# Patient Record
Sex: FEMALE | Race: WHITE | Employment: FULL TIME | URBAN - METROPOLITAN AREA
[De-identification: names, ages, dates, MRNs, and addresses within clinical notes are randomized per-mention and may not be internally consistent; named-entity substitution may affect disease eponyms.]

---

## 2017-03-27 ENCOUNTER — MYC MEDICAL ADVICE (OUTPATIENT)
Dept: FAMILY MEDICINE | Facility: CLINIC | Age: 47
End: 2017-03-27

## 2017-03-28 DIAGNOSIS — F41.8 SITUATIONAL ANXIETY: Primary | ICD-10-CM

## 2017-03-28 RX ORDER — LORAZEPAM 0.5 MG/1
TABLET ORAL
Qty: 20 TABLET | Refills: 0 | Status: SHIPPED | OUTPATIENT
Start: 2017-03-28 | End: 2018-03-06

## 2017-04-26 NOTE — PROGRESS NOTES
Shanna is a 47 year old female  that presents today for annual exam:   HPI:  Concern:  - gaining weight;  less exercise - work is stressful.   1) Hypothyroidism: On levothyroxine 100 mcg   2) Hyperlipidemia: not on anything at this time    Knows she will need a statin    Will have labs today  Wt Readings from Last 5 Encounters:   17 80.3 kg (177 lb)   16 79.1 kg (174 lb 4.8 oz)   16 76.2 kg (168 lb)   16 77.2 kg (170 lb 1.6 oz)   07/22/15 77.1 kg (170 lb)       HCM: mammogram 2016;  Colonoscopy ;  ROS:  General: generally feeling well   Head/Eyes: none  Ears/Nose/Throat: none  Cardiovascular: none  Respiratory: none  Gastrointestinal: none  Breast: none  Genitourinary: none  Sexual Function: none  Musculoskeletal: some back pain   Skin: none  Neurological: none  Mental Health: none  Endocrine: none  OB/GYN HISTORY:  1)  1 para 1002 with a twins, a boy and girl via . Her  has had a vasectomy. Her periods are heavier and more irregular. Every 1-2 months.   Past Medical History:   Diagnosis Date     Constipation      Depressive disorder, not elsewhere classified     was on prozac in past, now on Wellbutrin     Hypothyroid      IBS (irritable bowel syndrome)     Better off gluten and dairy     Infectious mononucleosis      Other kyphoscoliosis and scoliosis     see surgical history - multiple surgeries     Pure hypercholesterolemia    HCM: Mammogram 2015;  PAST SURGICAL HISTORY:   Jorge placement in her teens for scoliosis  FAMILY HISTORY:   Mother with Hyperlipdemia  Life Style Modifiers:   Tobacco:  reports that she quit smoking about 17 years ago. She has a 4.00 pack-year smoking history. She has never used smokeless tobacco.   Alcohol:  reports that she drinks alcohol.   Drug use:  reports that she does not use illicit drugs.  PAST SURGICAL HISTORY:  Past Surgical History:   Procedure Laterality Date     BIOPSY OF SKIN LESION       C   "DELIVERY ONLY      , Low Cervical     C SPINE FUSION,POSTER,13+ SGMTS      fusions x2 and removal of rods x 2 and one for infection     HC OPEN TREATMENT CLAVICULAR FRACTURE INTERNAL FX      after non-union- left   FAMILY HISTORY:  Family History   Problem Relation Age of Onset     Lipids Mother      Lipids Maternal Grandmother      Lipids Maternal Grandfather      Lipids Maternal Aunt      Lipids Maternal Uncle      Asthma No family hx of      C.A.D. 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   MEDICATIONS:  Current Outpatient Prescriptions   Medication Sig Dispense Refill     LORazepam (ATIVAN) 0.5 MG tablet Take 30 minutes prior to departure.  Do not operate a vehicle after taking this medication 20 tablet 0     buPROPion (WELLBUTRIN XL) 300 MG 24 hr tablet Take 1 tablet (300 mg) by mouth daily 90 tablet 3     valACYclovir (VALTREX) 1000 mg tablet Take 1 tablet (1,000 mg) by mouth 2 times daily Take 1 tablet (1,000 mg) by mouth 2 times daily x 3-5 days during outbreak 30 tablet 3     levothyroxine (SYNTHROID,LEVOTHROID) 100 MCG tablet Take 1 tablet (100 mcg) by mouth daily 90 tablet 4     B Complex Vitamins (B COMPLEX PO)        VALACYCLOVIR HCL PO Take 1,000 mg by mouth daily as needed       tretinoin (RETIN-A) 0.025 % cream Use every night 50 g 12     Omega-3 Fatty Acids (FISH OIL PO)        ibuprofen (ADVIL,MOTRIN) 600 MG tablet Take 1 tablet by mouth 3 times daily as needed.     ALLERGIES:  Nkda [no known drug allergies]  VITALS:  /70  Pulse 64  Ht 1.727 m (5' 8\")  Wt 80.3 kg (177 lb)  LMP 2017  BMI 26.91 kg/m2.   PHYSICAL EXAM:  Constitutional: Well appearing woman in no acute distress.   Psychological: appropriate mood.  Eyes: anicteric, normal extra-ocular movements,  pupils are equal and reactive to light.   Ears, Nose and " Throat: tympanic membranes clear, nose clear and free of lesions, throat clear, moist mucous membrames, neck supple with full range of motion.    Neck: No thyroidmegaly.   Cardiovascular: regular rate and rhythm, normal S1 and S2, no murmurs, rubs or gallops, peripheral pulses full and symmetric   Respiratory: clear to auscultation, no wheezes or crackles, normal breath sounds.  Breast: Symmetrical without visible distortion or swelling. No masses noted. No nipple inversion, no breast dimpling or puckering. Axillary area without masses or lympadenapathy.   Gastrointestinal: positive bowel sounds, nontender, no hepatosplenomegaly, no masses. No guarding or rebound.  Genitourinary: Pelvic exam: normal vagina and vulva, normal cervix without lesions or tenderness, uterus normal size anteverted, adenxa normal in size without tenderness.  Musculoskeletal: full range of motion    Skin: no concerning lesions, no jaundice.  Neurological: normal gait, no tremor.   Diagnoses and associated orders for this visit:  Annual physical exam  -  Annual well exam including breast exam and pap smear/HPV   -  Mammogram at the breast center 11/2017  -  Goal weight 165  Hypothyroidism  - Levothyroxine (SYNTHROID,LEVOTHROID)100 mg  - T3 Free; Future  - T4 free; Future  - TSH; Future  Hyperlipidemia LDL goal <130  -     Has changed diet to gluten and dairy free  -     Will recheck lipid next week when fasting  -     Agrees to start simvastatin 10 mg  -     Basic Metabolic Panel; Future

## 2017-04-27 ENCOUNTER — OFFICE VISIT (OUTPATIENT)
Dept: FAMILY MEDICINE | Facility: CLINIC | Age: 47
End: 2017-04-27
Attending: FAMILY MEDICINE
Payer: COMMERCIAL

## 2017-04-27 VITALS
HEIGHT: 68 IN | WEIGHT: 177 LBS | SYSTOLIC BLOOD PRESSURE: 117 MMHG | DIASTOLIC BLOOD PRESSURE: 70 MMHG | HEART RATE: 64 BPM | BODY MASS INDEX: 26.83 KG/M2

## 2017-04-27 DIAGNOSIS — E03.9 ACQUIRED HYPOTHYROIDISM: ICD-10-CM

## 2017-04-27 DIAGNOSIS — E78.5 HYPERLIPIDEMIA LDL GOAL <130: ICD-10-CM

## 2017-04-27 DIAGNOSIS — Z12.4 CERVICAL CANCER SCREENING: ICD-10-CM

## 2017-04-27 DIAGNOSIS — Z00.00 ANNUAL PHYSICAL EXAM: Primary | ICD-10-CM

## 2017-04-27 DIAGNOSIS — E03.8 OTHER SPECIFIED HYPOTHYROIDISM: ICD-10-CM

## 2017-04-27 PROCEDURE — 99213 OFFICE O/P EST LOW 20 MIN: CPT | Mod: ZF

## 2017-04-27 PROCEDURE — G0145 SCR C/V CYTO,THINLAYER,RESCR: HCPCS | Performed by: FAMILY MEDICINE

## 2017-04-27 RX ORDER — LEVOTHYROXINE SODIUM 100 UG/1
100 TABLET ORAL DAILY
Qty: 90 TABLET | Refills: 4 | Status: SHIPPED | OUTPATIENT
Start: 2017-04-27 | End: 2018-05-22

## 2017-04-27 RX ORDER — ALBUTEROL SULFATE 90 UG/1
2 AEROSOL, METERED RESPIRATORY (INHALATION)
COMMUNITY
Start: 2017-04-04 | End: 2017-04-27

## 2017-04-27 RX ORDER — SIMVASTATIN 10 MG
10 TABLET ORAL AT BEDTIME
Qty: 90 TABLET | Refills: 1 | Status: SHIPPED | OUTPATIENT
Start: 2017-04-27 | End: 2017-10-18

## 2017-04-27 RX ORDER — CODEINE PHOSPHATE/GUAIFENESIN 10-100MG/5
5 LIQUID (ML) ORAL
COMMUNITY
Start: 2017-04-04 | End: 2017-04-27

## 2017-04-27 ASSESSMENT — PAIN SCALES - GENERAL: PAINLEVEL: NO PAIN (0)

## 2017-04-27 ASSESSMENT — ANXIETY QUESTIONNAIRES
5. BEING SO RESTLESS THAT IT IS HARD TO SIT STILL: SEVERAL DAYS
GAD7 TOTAL SCORE: 7
3. WORRYING TOO MUCH ABOUT DIFFERENT THINGS: SEVERAL DAYS
1. FEELING NERVOUS, ANXIOUS, OR ON EDGE: SEVERAL DAYS
7. FEELING AFRAID AS IF SOMETHING AWFUL MIGHT HAPPEN: SEVERAL DAYS
2. NOT BEING ABLE TO STOP OR CONTROL WORRYING: SEVERAL DAYS
6. BECOMING EASILY ANNOYED OR IRRITABLE: SEVERAL DAYS

## 2017-04-27 ASSESSMENT — PATIENT HEALTH QUESTIONNAIRE - PHQ9: 5. POOR APPETITE OR OVEREATING: SEVERAL DAYS

## 2017-04-27 NOTE — LETTER
2017       RE: Shanna Kang  [address] [facility] [address]     Dear Colleague,    Thank you for referring your patient, Shanna Kang, to the [facility] at [facility]. Please see a copy of my visit note below.    Shanna is a 47 year old female  that presents today for annual exam:   HPI:  Concern:  - gaining weight;  less exercise - work is stressful.   1) Hypothyroidism: On levothyroxine 100 mcg   2) Hyperlipidemia: not on anything at this time    Knows she will need a statin    Will have labs today  Wt Readings from Last 5 Encounters:   17 80.3 kg (177 lb)   16 79.1 kg (174 lb 4.8 oz)   16 76.2 kg (168 lb)   16 77.2 kg (170 lb 1.6 oz)   07/22/15 77.1 kg (170 lb)       HCM: mammogram 2016;  Colonoscopy ;  ROS:  General: generally feeling well   Head/Eyes: none  Ears/Nose/Throat: none  Cardiovascular: none  Respiratory: none  Gastrointestinal: none  Breast: none  Genitourinary: none  Sexual Function: none  Musculoskeletal: some back pain   Skin: none  Neurological: none  Mental Health: none  Endocrine: none  OB/GYN HISTORY:  1)  1 para 1002 with a twins, a boy and girl via . Her  has had a vasectomy. Her periods are heavier and more irregular. Every 1-2 months.   Past Medical History:   Diagnosis Date     Constipation      Depressive disorder, not elsewhere classified     was on prozac in past, now on Wellbutrin     Hypothyroid      IBS (irritable bowel syndrome)     Better off gluten and dairy     Infectious mononucleosis      Other kyphoscoliosis and scoliosis     see surgical history - multiple surgeries     Pure hypercholesterolemia    HCM: Mammogram 2015;  PAST SURGICAL HISTORY:   Jorge placement in her teens for scoliosis  FAMILY HISTORY:   Mother with Hyperlipdemia  Life Style Modifiers:   Tobacco:  reports that she quit smoking about 17 years ago. She has a  4.00 pack-year smoking history. She has never used smokeless tobacco.   Alcohol:  reports that she drinks alcohol.   Drug use:  reports that she does not use illicit drugs.  PAST SURGICAL HISTORY:  Past Surgical History:   Procedure Laterality Date     BIOPSY OF SKIN LESION       C  DELIVERY ONLY      , Low Cervical     C SPINE FUSION,POSTER,13+ SGMTS      fusions x2 and removal of rods x 2 and one for infection     HC OPEN TREATMENT CLAVICULAR FRACTURE INTERNAL FX      after non-union- left   FAMILY HISTORY:  Family History   Problem Relation Age of Onset     Lipids Mother      Lipids Maternal Grandmother      Lipids Maternal Grandfather      Lipids Maternal Aunt      Lipids Maternal Uncle      Asthma No family hx of      C.A.D. 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   MEDICATIONS:  Current Outpatient Prescriptions   Medication Sig Dispense Refill     LORazepam (ATIVAN) 0.5 MG tablet Take 30 minutes prior to departure.  Do not operate a vehicle after taking this medication 20 tablet 0     buPROPion (WELLBUTRIN XL) 300 MG 24 hr tablet Take 1 tablet (300 mg) by mouth daily 90 tablet 3     valACYclovir (VALTREX) 1000 mg tablet Take 1 tablet (1,000 mg) by mouth 2 times daily Take 1 tablet (1,000 mg) by mouth 2 times daily x 3-5 days during outbreak 30 tablet 3     levothyroxine (SYNTHROID,LEVOTHROID) 100 MCG tablet Take 1 tablet (100 mcg) by mouth daily 90 tablet 4     B Complex Vitamins (B COMPLEX PO)        VALACYCLOVIR HCL PO Take 1,000 mg by mouth daily as needed       tretinoin (RETIN-A) 0.025 % cream Use every night 50 g 12     Omega-3 Fatty Acids (FISH OIL PO)        ibuprofen (ADVIL,MOTRIN) 600 MG tablet Take 1 tablet by mouth 3 times daily as needed.     ALLERGIES:  Nkda [no known drug allergies]  VITALS:  /70  Pulse 64  Ht  "1.727 m (5' 8\")  Wt 80.3 kg (177 lb)  LMP 02/14/2017  BMI 26.91 kg/m2.   PHYSICAL EXAM:  Constitutional: Well appearing woman in no acute distress.   Psychological: appropriate mood.  Eyes: anicteric, normal extra-ocular movements,  pupils are equal and reactive to light.   Ears, Nose and Throat: tympanic membranes clear, nose clear and free of lesions, throat clear, moist mucous membrames, neck supple with full range of motion.    Neck: No thyroidmegaly.   Cardiovascular: regular rate and rhythm, normal S1 and S2, no murmurs, rubs or gallops, peripheral pulses full and symmetric   Respiratory: clear to auscultation, no wheezes or crackles, normal breath sounds.  Breast: Symmetrical without visible distortion or swelling. No masses noted. No nipple inversion, no breast dimpling or puckering. Axillary area without masses or lympadenapathy.   Gastrointestinal: positive bowel sounds, nontender, no hepatosplenomegaly, no masses. No guarding or rebound.  Genitourinary: Pelvic exam: normal vagina and vulva, normal cervix without lesions or tenderness, uterus normal size anteverted, adenxa normal in size without tenderness.  Musculoskeletal: full range of motion    Skin: no concerning lesions, no jaundice.  Neurological: normal gait, no tremor.   Diagnoses and associated orders for this visit:  Annual physical exam  -  Annual well exam including breast exam and pap smear/HPV   -  Mammogram at the breast center 11/2017  -  Goal weight 165  Hypothyroidism  - Levothyroxine (SYNTHROID,LEVOTHROID)100 mg  - T3 Free; Future  - T4 free; Future  - TSH; Future  Hyperlipidemia LDL goal <130  -     Has changed diet to gluten and dairy free  -     Will recheck lipid next week when fasting  -     Agrees to start simvastatin 10 mg  -     Basic Metabolic Panel; Future    Again, thank you for allowing me to participate in the care of your patient.      Sincerely,    Betty Duncan MD      "

## 2017-04-27 NOTE — MR AVS SNAPSHOT
After Visit Summary   4/27/2017    Shanna Kang    MRN: [id number]           Patient Information     Date Of Birth          1970        Visit Information        Provider Department      4/27/2017 9:00 AM Betty Duncan MD [facility]        Today's Diagnoses     Annual physical exam    -  1    Cervical cancer screening        Hyperlipidemia LDL goal <130        Acquired hypothyroidism        Other specified hypothyroidism           Follow-ups after your visit        Future tests that were ordered for you today     Open Future Orders        Priority Expected Expires Ordered    TSH Routine 4/28/2017 4/27/2018 4/27/2017    Lipid Panel Routine 4/28/2017 4/27/2018 4/27/2017    Basic Metabolic Panel Routine 4/28/2017 4/27/2018 4/27/2017            Who to contact     Please call your clinic at [phone] to:    Ask questions about your health    Make or cancel appointments    Discuss your medicines    Learn about your test results    Speak to your doctor   If you have compliments or concerns about an experience at your clinic, or if you wish to file a complaint, please contact [facility] Patient Relations at [phone] or email us at [email or web address]         Additional Information About Your Visit        MyChart Information     [employer] gives you secure access to your electronic health record. If you see a primary care provider, you can also send messages to your care team and make appointments. If you have questions, please call your primary care clinic.  If you do not have a primary care provider, please call [phone] and they will assist you.      [email or web address] is an electronic gateway that provides easy, online access to your medical records. With [email or web address], you can request a clinic appointment, read your test results, renew a prescription or communicate with your care team.     To access your existing account, please contact your  "[facility] or call [phone] for assistance.        Care EveryWhere ID     This is your Care EveryWhere ID. This could be used by other organizations to access your [place] medical records  [id number]        Your Vitals Were     Pulse Height Last Period BMI (Body Mass Index)          64 1.727 m (5' 8\") 02/14/2017 26.91 kg/m2         Blood Pressure from Last 3 Encounters:   04/27/17 117/70   08/29/16 110/56   04/26/16 115/64    Weight from Last 3 Encounters:   04/27/17 80.3 kg (177 lb)   11/08/16 79.1 kg (174 lb 4.8 oz)   08/29/16 76.2 kg (168 lb)              We Performed the Following     Obtaining, preparing and conveyance of cervical or vaginal smear to laboratory.     Pap imaged thin layer screen reflex to HPV if ASCUS - recommended age 25 - 29 years          Today's Medication Changes          These changes are accurate as of: 4/27/17 10:31 AM.  If you have any questions, ask your nurse or doctor.               Start taking these medicines.        Dose/Directions    simvastatin 10 MG tablet   Commonly known as:  ZOCOR   Used for:  Hyperlipidemia LDL goal <130   Started by:  Betty Duncan MD        Dose:  10 mg   Take 1 tablet (10 mg) by mouth At Bedtime   Quantity:  90 tablet   Refills:  1            Where to get your medicines      These medications were sent to [place] [address] [place] [facility] [address] [facility]  [address] [facility] [address]     Phone:  [phone]     levothyroxine 100 MCG tablet    simvastatin 10 MG tablet                Primary Care Provider Office Phone # Fax #    Betty Duncan [phone]       [facility] [address] [facility] [address]  [facility] [address]        Thank you!     Thank you for choosing [facility]  for your care. Our goal is always to provide you with excellent care. Hearing back from our patients is one way we can continue to improve our services. Please " take a few minutes to complete the written survey that you may receive in the mail after your visit with us. Thank you!             Your Updated Medication List - Protect others around you: Learn how to safely use, store and throw away your medicines at www.disposemymeds.org.          This list is accurate as of: 4/27/17 10:31 AM.  Always use your most recent med list.                   Brand Name Dispense Instructions for use    B COMPLEX PO          buPROPion 300 MG 24 hr tablet    WELLBUTRIN XL    90 tablet    Take 1 tablet (300 mg) by mouth daily       FISH OIL PO          ibuprofen 600 MG tablet    ADVIL/MOTRIN     Take 1 tablet by mouth 3 times daily as needed.       levothyroxine 100 MCG tablet    SYNTHROID/LEVOTHROID    90 tablet    Take 1 tablet (100 mcg) by mouth daily       LORazepam 0.5 MG tablet    ATIVAN    20 tablet    Take 30 minutes prior to departure.  Do not operate a vehicle after taking this medication       simvastatin 10 MG tablet    ZOCOR    90 tablet    Take 1 tablet (10 mg) by mouth At Bedtime       tretinoin 0.025 % cream    RETIN-A    50 g    Use every night       valACYclovir 1000 mg tablet    VALTREX    30 tablet    Take 1 tablet (1,000 mg) by mouth 2 times daily Take 1 tablet (1,000 mg) by mouth 2 times daily x 3-5 days during outbreak

## 2017-04-28 DIAGNOSIS — E03.9 ACQUIRED HYPOTHYROIDISM: ICD-10-CM

## 2017-04-28 DIAGNOSIS — Z00.00 ANNUAL PHYSICAL EXAM: ICD-10-CM

## 2017-04-28 DIAGNOSIS — E78.5 HYPERLIPIDEMIA LDL GOAL <130: ICD-10-CM

## 2017-04-28 LAB
ANION GAP SERPL CALCULATED.3IONS-SCNC: 5 MMOL/L (ref 3–14)
BUN SERPL-MCNC: 11 MG/DL (ref 7–30)
CALCIUM SERPL-MCNC: 8.7 MG/DL (ref 8.5–10.1)
CHLORIDE SERPL-SCNC: 104 MMOL/L (ref 94–109)
CHOLEST SERPL-MCNC: 284 MG/DL
CO2 SERPL-SCNC: 30 MMOL/L (ref 20–32)
CREAT SERPL-MCNC: 0.77 MG/DL (ref 0.52–1.04)
GFR SERPL CREATININE-BSD FRML MDRD: 81 ML/MIN/1.7M2
GLUCOSE SERPL-MCNC: 82 MG/DL (ref 70–99)
HDLC SERPL-MCNC: 100 MG/DL
LDLC SERPL CALC-MCNC: 174 MG/DL
NONHDLC SERPL-MCNC: 184 MG/DL
POTASSIUM SERPL-SCNC: 3.6 MMOL/L (ref 3.4–5.3)
SODIUM SERPL-SCNC: 139 MMOL/L (ref 133–144)
TRIGL SERPL-MCNC: 53 MG/DL
TSH SERPL DL<=0.05 MIU/L-ACNC: 3.16 MU/L (ref 0.4–4)

## 2017-04-28 ASSESSMENT — PATIENT HEALTH QUESTIONNAIRE - PHQ9: SUM OF ALL RESPONSES TO PHQ QUESTIONS 1-9: 4

## 2017-04-28 ASSESSMENT — ANXIETY QUESTIONNAIRES: GAD7 TOTAL SCORE: 7

## 2017-05-01 LAB
COPATH REPORT: NORMAL
PAP: NORMAL

## 2017-07-01 DIAGNOSIS — E03.8 OTHER SPECIFIED HYPOTHYROIDISM: ICD-10-CM

## 2017-07-03 RX ORDER — LEVOTHYROXINE SODIUM 100 UG/1
TABLET ORAL
Qty: 90 TABLET | Refills: 3 | OUTPATIENT
Start: 2017-07-03

## 2017-08-16 DIAGNOSIS — F32.89 MINOR DEPRESSIVE DISORDER: ICD-10-CM

## 2017-08-17 RX ORDER — BUPROPION HYDROCHLORIDE 300 MG/1
TABLET ORAL
Qty: 90 TABLET | Refills: 3 | Status: SHIPPED | OUTPATIENT
Start: 2017-08-17 | End: 2018-05-22

## 2017-08-17 NOTE — TELEPHONE ENCOUNTER
Received refill request for wellbutrin  Last in clinic 4/2017 for annual and PHQ score 4. Medication not discussed specifically so will forward to Dr Duncan for approval

## 2017-10-18 DIAGNOSIS — E78.5 HYPERLIPIDEMIA LDL GOAL <130: ICD-10-CM

## 2017-10-19 RX ORDER — SIMVASTATIN 10 MG
TABLET ORAL
Qty: 90 TABLET | Refills: 1 | Status: SHIPPED | OUTPATIENT
Start: 2017-10-19 | End: 2018-05-10

## 2017-10-19 NOTE — TELEPHONE ENCOUNTER
Received refill request for simvastatin. Spoke with Dr. Duncan and she agrees to refill. Would like patient to have labs repeated when she is able to get them done. Nurse input order and refilled Rx.

## 2017-12-27 ENCOUNTER — RADIANT APPOINTMENT (OUTPATIENT)
Dept: MAMMOGRAPHY | Facility: CLINIC | Age: 47
End: 2017-12-27
Payer: COMMERCIAL

## 2017-12-27 DIAGNOSIS — Z12.31 VISIT FOR SCREENING MAMMOGRAM: ICD-10-CM

## 2018-03-05 ENCOUNTER — MYC MEDICAL ADVICE (OUTPATIENT)
Dept: FAMILY MEDICINE | Facility: CLINIC | Age: 48
End: 2018-03-05

## 2018-03-06 DIAGNOSIS — F41.8 SITUATIONAL ANXIETY: ICD-10-CM

## 2018-03-06 RX ORDER — LORAZEPAM 0.5 MG/1
TABLET ORAL
Qty: 20 TABLET | Refills: 0 | Status: SHIPPED | OUTPATIENT
Start: 2018-03-06 | End: 2019-02-07

## 2018-05-10 DIAGNOSIS — E78.5 HYPERLIPIDEMIA LDL GOAL <130: ICD-10-CM

## 2018-05-15 RX ORDER — SIMVASTATIN 10 MG
TABLET ORAL
Qty: 90 TABLET | Refills: 0 | Status: SHIPPED | OUTPATIENT
Start: 2018-05-15 | End: 2018-05-22

## 2018-05-15 NOTE — TELEPHONE ENCOUNTER
Spoke to Shanna about zocor refill. Last annual  4/2017 so will schedule today. Transferred to ,90 day supply refill sent to cover until seen.Pt indicated understanding and agreed with plan.

## 2018-05-21 NOTE — PROGRESS NOTES
Shanna is a 48 year old female  that presents today for annual exam:   HPI:  Concern:  1) Hypothyroidism: On levothyroxine 100 mcg   2) Hyperlipidemia:  with . Simvastatin 10 mg .   Wt Readings from Last 5 Encounters:   18 78.7 kg (173 lb 6.4 oz)   17 80.3 kg (177 lb)   16 79.1 kg (174 lb 4.8 oz)   16 76.2 kg (168 lb)   16 77.2 kg (170 lb 1.6 oz)     HCM: mammogram 2016;  Colonoscopy 2017;  ROS:  General: generally feeling well   Head/Eyes: none  Ears/Nose/Throat: none  Cardiovascular: none  Respiratory: none  Gastrointestinal: none  Breast: none  Genitourinary: none  Sexual Function: none  Musculoskeletal: some back pain   Skin: none  Neurological: none  Mental Health: none  Endocrine: none  OB/GYN HISTORY:  1)  1 para 1002 with a twins, a boy and girl via . Her  has had a vasectomy. Her periods are heavier and more irregular. Every 1-2 months. No recent hot flashes.   Past Medical History:   Diagnosis Date     Constipation      Depressive disorder, not elsewhere classified     was on prozac in past, now on Wellbutrin     Hypothyroid      IBS (irritable bowel syndrome)     Better off gluten and dairy     Infectious mononucleosis      Other kyphoscoliosis and scoliosis     see surgical history - multiple surgeries     Pure hypercholesterolemia    PAST SURGICAL HISTORY:   Jorge placement in her teens for scoliosis  FAMILY HISTORY:   Mother with Hyperlipdemia  Life Style Modifiers:   Tobacco:  reports that she quit smoking about 18 years ago. She has a 4.00 pack-year smoking history. She has never used smokeless tobacco.   Alcohol:  reports that she drinks alcohol.   Drug use:  reports that she does not use illicit drugs.  PAST SURGICAL HISTORY:  Past Surgical History:   Procedure Laterality Date     BIOPSY OF SKIN LESION       C  DELIVERY ONLY      , Low Cervical     C SPINE FUSION,POSTER,13+ SGMTS      fusions x2 and  "removal of rods x 2 and one for infection     HC OPEN TREATMENT CLAVICULAR FRACTURE INTERNAL FX      after non-union- left   FAMILY HISTORY:  Family History   Problem Relation Age of Onset     Lipids Mother      Lipids Maternal Grandmother      Lipids Maternal Grandfather      Lipids Maternal Aunt      Lipids Maternal Uncle      Asthma No family hx of      C.A.D. 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   MEDICATIONS:  Current Outpatient Prescriptions   Medication Sig Dispense Refill     B Complex Vitamins (B COMPLEX PO)        buPROPion (WELLBUTRIN XL) 300 MG 24 hr tablet TAKE ONE TABLET BY MOUTH DAILY. 90 tablet 3     ibuprofen (ADVIL,MOTRIN) 600 MG tablet Take 1 tablet by mouth 3 times daily as needed.       levothyroxine (SYNTHROID/LEVOTHROID) 100 MCG tablet Take 1 tablet (100 mcg) by mouth daily 90 tablet 4     LORazepam (ATIVAN) 0.5 MG tablet Take 30 minutes prior to departure.  Do not operate a vehicle after taking this medication 20 tablet 0     Omega-3 Fatty Acids (FISH OIL PO)        simvastatin (ZOCOR) 10 MG tablet TAKE 1 TABLET (10 MG) BY MOUTH AT BEDTIME 90 tablet 0     tretinoin (RETIN-A) 0.025 % cream Use every night 50 g 12     valACYclovir (VALTREX) 1000 mg tablet Take 1 tablet (1,000 mg) by mouth 2 times daily Take 1 tablet (1,000 mg) by mouth 2 times daily x 3-5 days during outbreak (Patient not taking: Reported on 4/27/2017) 30 tablet 3   ALLERGIES:  Nkda [no known drug allergies]  VITALS:  /69  Pulse 61  Ht 1.727 m (5' 8\")  Wt 78.7 kg (173 lb 6.4 oz)  BMI 26.37 kg/m2  PHYSICAL EXAM:  Constitutional: Well appearing woman in no acute distress.   Psychological: appropriate mood.  Eyes: anicteric, normal extra-ocular movements,  pupils are equal and reactive to light.   Ears, Nose and " Throat: tympanic membranes clear, No thyroidmegaly.   Cardiovascular: regular rate and rhythm, normal S1 and S2, no murmurs, rubs or gallops, peripheral pulses full and symmetric   Respiratory: clear to auscultation, no wheezes or crackles, normal breath sounds.  Breast: Symmetrical without visible distortion or swelling. No masses noted. No nipple inversion, no breast dimpling or puckering. Axillary area without masses or lympadenapathy.   Gastrointestinal: positive bowel sounds, nontender, no hepatosplenomegaly, no masses. No guarding or rebound.  Genitourinary: N/A   Musculoskeletal: back with scar mid spine [Jorge for scoliosis]  Skin: no concerning lesions, no jaundice.  Neurological: normal gait, no tremor.   Diagnoses and associated orders for this visit:  Annual physical exam  -  Annual well exam including breast exam- negative pap 4/2017 will need to repeat pap/HPV 4/2020  -  Mammogram at the breast center after 12/28/2018  -  Goal weight 165  Hypothyroidism  - RX: Levothyroxine (SYNTHROID,LEVOTHROID)100 mg  - T3 Free; Future  - T4 free; Future  - TSH; Future  Hyperlipidemia LDL goal <130  -     Has changed diet to gluten and dairy free  -     On simvastatin 10 mg and will obtain lipids in two weeks when fasting  Minor depressive disorder  -     RX: buPROPion (WELLBUTRIN XL) 300 MG 24 hr tablet; Take 1 tablet (300 mg) by mouth every morning  Other acne  -    RX: tretinoin (RETIN-A) 0.025 % cream; Use every night

## 2018-05-22 ENCOUNTER — OFFICE VISIT (OUTPATIENT)
Dept: FAMILY MEDICINE | Facility: CLINIC | Age: 48
End: 2018-05-22
Attending: FAMILY MEDICINE
Payer: COMMERCIAL

## 2018-05-22 VITALS
BODY MASS INDEX: 26.28 KG/M2 | DIASTOLIC BLOOD PRESSURE: 69 MMHG | WEIGHT: 173.4 LBS | HEART RATE: 61 BPM | SYSTOLIC BLOOD PRESSURE: 111 MMHG | HEIGHT: 68 IN

## 2018-05-22 DIAGNOSIS — L70.8 OTHER ACNE: ICD-10-CM

## 2018-05-22 DIAGNOSIS — E78.5 HYPERLIPIDEMIA LDL GOAL <130: ICD-10-CM

## 2018-05-22 DIAGNOSIS — E03.8 OTHER SPECIFIED HYPOTHYROIDISM: ICD-10-CM

## 2018-05-22 DIAGNOSIS — F32.89 MINOR DEPRESSIVE DISORDER: ICD-10-CM

## 2018-05-22 DIAGNOSIS — Z00.00 ANNUAL PHYSICAL EXAM: Primary | ICD-10-CM

## 2018-05-22 PROCEDURE — G0463 HOSPITAL OUTPT CLINIC VISIT: HCPCS | Mod: ZF

## 2018-05-22 RX ORDER — BUPROPION HYDROCHLORIDE 300 MG/1
300 TABLET ORAL EVERY MORNING
Qty: 90 TABLET | Refills: 3 | Status: SHIPPED | OUTPATIENT
Start: 2018-05-22 | End: 2019-05-29

## 2018-05-22 RX ORDER — TRETINOIN 0.25 MG/G
CREAM TOPICAL
Qty: 50 G | Refills: 12 | Status: SHIPPED | OUTPATIENT
Start: 2018-05-22 | End: 2019-04-25

## 2018-05-22 RX ORDER — LEVOTHYROXINE SODIUM 100 UG/1
100 TABLET ORAL DAILY
Qty: 90 TABLET | Refills: 4 | Status: SHIPPED | OUTPATIENT
Start: 2018-05-22 | End: 2020-04-21 | Stop reason: DRUGHIGH

## 2018-05-22 RX ORDER — SIMVASTATIN 10 MG
10 TABLET ORAL AT BEDTIME
Qty: 90 TABLET | Refills: 3 | Status: SHIPPED | OUTPATIENT
Start: 2018-05-22 | End: 2019-05-25

## 2018-05-22 ASSESSMENT — PATIENT HEALTH QUESTIONNAIRE - PHQ9: 5. POOR APPETITE OR OVEREATING: SEVERAL DAYS

## 2018-05-22 ASSESSMENT — ANXIETY QUESTIONNAIRES
3. WORRYING TOO MUCH ABOUT DIFFERENT THINGS: NOT AT ALL
GAD7 TOTAL SCORE: 3
6. BECOMING EASILY ANNOYED OR IRRITABLE: SEVERAL DAYS
7. FEELING AFRAID AS IF SOMETHING AWFUL MIGHT HAPPEN: SEVERAL DAYS
5. BEING SO RESTLESS THAT IT IS HARD TO SIT STILL: NOT AT ALL
1. FEELING NERVOUS, ANXIOUS, OR ON EDGE: NOT AT ALL
2. NOT BEING ABLE TO STOP OR CONTROL WORRYING: NOT AT ALL

## 2018-05-22 ASSESSMENT — PAIN SCALES - GENERAL: PAINLEVEL: NO PAIN (0)

## 2018-05-22 NOTE — MR AVS SNAPSHOT
After Visit Summary   5/22/2018    Shanna Kang    MRN: [id number]           Patient Information     Date Of Birth          1970        Visit Information        Provider Department      5/22/2018 8:00 AM Betty Duncan MD [facility]        Today's Diagnoses     Annual physical exam    -  1    Minor depressive disorder        Other specified hypothyroidism        Hyperlipidemia LDL goal <130        Other acne           Follow-ups after your visit        Future tests that were ordered for you today     Open Future Orders        Priority Expected Expires Ordered    T3 Free Routine  5/22/2019 5/22/2018    T4 free Routine  5/22/2019 5/22/2018    TSH Routine  5/22/2019 5/22/2018            Who to contact     Please call your clinic at [phone] to:    Ask questions about your health    Make or cancel appointments    Discuss your medicines    Learn about your test results    Speak to your doctor            Additional Information About Your Visit        MyChart Information     [employer] gives you secure access to your electronic health record. If you see a primary care provider, you can also send messages to your care team and make appointments. If you have questions, please call your primary care clinic.  If you do not have a primary care provider, please call [phone] and they will assist you.      [employer] is an electronic gateway that provides easy, online access to your medical records. With [employer], you can request a clinic appointment, read your test results, renew a prescription or communicate with your care team.     To access your existing account, please contact your [facility] or call [phone] for assistance.        Care EveryWhere ID     This is your Care EveryWhere ID. This could be used by other organizations to access your [facility] medical records  [id number]        Your Vitals Were     Pulse Height BMI (Body Mass  "Index)             61 1.727 m (5' 8\") 26.37 kg/m2          Blood Pressure from Last 3 Encounters:   05/22/18 111/69   04/27/17 117/70   08/29/16 110/56    Weight from Last 3 Encounters:   05/22/18 78.7 kg (173 lb 6.4 oz)   04/27/17 80.3 kg (177 lb)   11/08/16 79.1 kg (174 lb 4.8 oz)                 Today's Medication Changes          These changes are accurate as of 5/22/18  8:19 PM.  If you have any questions, ask your nurse or doctor.               These medicines have changed or have updated prescriptions.        Dose/Directions    buPROPion 300 MG 24 hr tablet   Commonly known as:  WELLBUTRIN XL   This may have changed:  See the new instructions.   Used for:  Minor depressive disorder   Changed by:  Betty Duncan MD        Dose:  300 mg   Take 1 tablet (300 mg) by mouth every morning   Quantity:  90 tablet   Refills:  3       simvastatin 10 MG tablet   Commonly known as:  ZOCOR   This may have changed:  See the new instructions.   Used for:  Hyperlipidemia LDL goal <130   Changed by:  Betty Duncan MD        Dose:  10 mg   Take 1 tablet (10 mg) by mouth At Bedtime   Quantity:  90 tablet   Refills:  3            Where to get your medicines      These medications were sent to [place] [id number] [place] [facility] [place], [place] - [address] [address]  [address] [facility] [address]     Phone:  [phone]     buPROPion 300 MG 24 hr tablet    levothyroxine 100 MCG tablet    simvastatin 10 MG tablet    tretinoin 0.025 % cream                Primary Care Provider Office Phone # Fax #    Betty Duncan [phone]       [address] [facility] [address]  [facility] [address]        Equal Access to Services     LUKE BRIZUELA AH: Brianda Tran, waapril ludontae, qatricia kaalmada zaida, kami lopez. So [facility] [phone].    ATENCIÓN: Si habla español, tiene a davis disposición servicios gratuitos de asistencia lingüística. Llame al [phone].    We comply with " applicable federal civil rights laws and [place] laws. We do not discriminate on the basis of race, color, national origin, age, disability, sex, sexual orientation, or gender identity.            Thank you!     Thank you for choosing [facility]  for your care. Our goal is always to provide you with excellent care. Hearing back from our patients is one way we can continue to improve our services. Please take a few minutes to complete the written survey that you may receive in the mail after your visit with us. Thank you!             Your Updated Medication List - Protect others around you: Learn how to safely use, store and throw away your medicines at www.disposemymeds.org.          This list is accurate as of 5/22/18  8:19 PM.  Always use your most recent med list.                   Brand Name Dispense Instructions for use Diagnosis    B COMPLEX PO           buPROPion 300 MG 24 hr tablet    WELLBUTRIN XL    90 tablet    Take 1 tablet (300 mg) by mouth every morning    Minor depressive disorder       FISH OIL PO           ibuprofen 600 MG tablet    ADVIL/MOTRIN     Take 1 tablet by mouth 3 times daily as needed.        levothyroxine 100 MCG tablet    SYNTHROID/LEVOTHROID    90 tablet    Take 1 tablet (100 mcg) by mouth daily    Other specified hypothyroidism       LORazepam 0.5 MG tablet    ATIVAN    20 tablet    Take 30 minutes prior to departure.  Do not operate a vehicle after taking this medication    Situational anxiety       simvastatin 10 MG tablet    ZOCOR    90 tablet    Take 1 tablet (10 mg) by mouth At Bedtime    Hyperlipidemia LDL goal <130       tretinoin 0.025 % cream    RETIN-A    50 g    Use every night    Other acne       valACYclovir 1000 mg tablet    VALTREX    30 tablet    Take 1 tablet (1,000 mg) by mouth 2 times daily Take 1 tablet (1,000 mg) by mouth 2 times daily x 3-5 days during outbreak    Herpes simplex virus infection

## 2018-05-22 NOTE — LETTER
2018       RE: Shanna Kang  [address] [facility] [address]     Dear Colleague,    Thank you for referring your patient, Shanna Kang, to the [facility] at [facility]. Please see a copy of my visit note below.    Shanna is a 48 year old female  that presents today for annual exam:   HPI:  Concern:  1) Hypothyroidism: On levothyroxine 100 mcg   2) Hyperlipidemia:  with . Simvastatin 10 mg .   Wt Readings from Last 5 Encounters:   18 78.7 kg (173 lb 6.4 oz)   17 80.3 kg (177 lb)   16 79.1 kg (174 lb 4.8 oz)   16 76.2 kg (168 lb)   16 77.2 kg (170 lb 1.6 oz)     HCM: mammogram 2016;  Colonoscopy 2017;  ROS:  General: generally feeling well   Head/Eyes: none  Ears/Nose/Throat: none  Cardiovascular: none  Respiratory: none  Gastrointestinal: none  Breast: none  Genitourinary: none  Sexual Function: none  Musculoskeletal: some back pain   Skin: none  Neurological: none  Mental Health: none  Endocrine: none  OB/GYN HISTORY:  1)  1 para 1002 with a twins, a boy and girl via . Her  has had a vasectomy. Her periods are heavier and more irregular. Every 1-2 months. No recent hot flashes.   Past Medical History:   Diagnosis Date     Constipation      Depressive disorder, not elsewhere classified     was on prozac in past, now on Wellbutrin     Hypothyroid      IBS (irritable bowel syndrome)     Better off gluten and dairy     Infectious mononucleosis      Other kyphoscoliosis and scoliosis     see surgical history - multiple surgeries     Pure hypercholesterolemia    PAST SURGICAL HISTORY:   Jorge placement in her teens for scoliosis  FAMILY HISTORY:   Mother with Hyperlipdemia  Life Style Modifiers:   Tobacco:  reports that she quit smoking about 18 years ago. She has a 4.00 pack-year smoking history. She has never used smokeless tobacco.   Alcohol:  reports  that she drinks alcohol.   Drug use:  reports that she does not use illicit drugs.  PAST SURGICAL HISTORY:  Past Surgical History:   Procedure Laterality Date     BIOPSY OF SKIN LESION       C  DELIVERY ONLY      , Low Cervical     C SPINE FUSION,POSTER,13+ SGMTS      fusions x2 and removal of rods x 2 and one for infection     HC OPEN TREATMENT CLAVICULAR FRACTURE INTERNAL FX      after non-union- left   FAMILY HISTORY:  Family History   Problem Relation Age of Onset     Lipids Mother      Lipids Maternal Grandmother      Lipids Maternal Grandfather      Lipids Maternal Aunt      Lipids Maternal Uncle      Asthma No family hx of      C.A.D. No family hx of      DIABETES No family hx of      Hypertension No family hx of      Breast Cancer No family hx of    SOCIAL HISTORY:  . 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  . Twins - they will go off to college this 2018.   MEDICATIONS:  Current Outpatient Prescriptions   Medication Sig Dispense Refill     B Complex Vitamins (B COMPLEX PO)        buPROPion (WELLBUTRIN XL) 300 MG 24 hr tablet TAKE ONE TABLET BY MOUTH DAILY. 90 tablet 3     ibuprofen (ADVIL,MOTRIN) 600 MG tablet Take 1 tablet by mouth 3 times daily as needed.       levothyroxine (SYNTHROID/LEVOTHROID) 100 MCG tablet Take 1 tablet (100 mcg) by mouth daily 90 tablet 4     LORazepam (ATIVAN) 0.5 MG tablet Take 30 minutes prior to departure.  Do not operate a vehicle after taking this medication 20 tablet 0     Omega-3 Fatty Acids (FISH OIL PO)        simvastatin (ZOCOR) 10 MG tablet TAKE 1 TABLET (10 MG) BY MOUTH AT BEDTIME 90 tablet 0     tretinoin (RETIN-A) 0.025 % cream Use every night 50 g 12     valACYclovir (VALTREX) 1000 mg tablet Take 1 tablet (1,000 mg) by mouth 2 times daily Take 1 tablet (1,000 mg) by mouth 2 times daily x 3-5 days during outbreak (Patient not taking: Reported on 2017) 30 tablet 3   ALLERGIES:  Nkda [no known drug allergies]  VITALS:  /69   "Pulse 61  Ht 1.727 m (5' 8\")  Wt 78.7 kg (173 lb 6.4 oz)  BMI 26.37 kg/m2  PHYSICAL EXAM:  Constitutional: Well appearing woman in no acute distress.   Psychological: appropriate mood.  Eyes: anicteric, normal extra-ocular movements,  pupils are equal and reactive to light.   Ears, Nose and Throat: tympanic membranes clear, No thyroidmegaly.   Cardiovascular: regular rate and rhythm, normal S1 and S2, no murmurs, rubs or gallops, peripheral pulses full and symmetric   Respiratory: clear to auscultation, no wheezes or crackles, normal breath sounds.  Breast: Symmetrical without visible distortion or swelling. No masses noted. No nipple inversion, no breast dimpling or puckering. Axillary area without masses or lympadenapathy.   Gastrointestinal: positive bowel sounds, nontender, no hepatosplenomegaly, no masses. No guarding or rebound.  Genitourinary: N/A   Musculoskeletal: back with scar mid spine [Jorge for scoliosis]  Skin: no concerning lesions, no jaundice.  Neurological: normal gait, no tremor.   Diagnoses and associated orders for this visit:  Annual physical exam  -  Annual well exam including breast exam- negative pap 4/2017 will need to repeat pap/HPV 4/2020  -  Mammogram at the breast center after 12/28/2018  -  Goal weight 165  Hypothyroidism  - RX: Levothyroxine (SYNTHROID,LEVOTHROID)100 mg  - T3 Free; Future  - T4 free; Future  - TSH; Future  Hyperlipidemia LDL goal <130  -     Has changed diet to gluten and dairy free  -     On simvastatin 10 mg and will obtain lipids in two weeks when fasting  Minor depressive disorder  -     RX: buPROPion (WELLBUTRIN XL) 300 MG 24 hr tablet; Take 1 tablet (300 mg) by mouth every morning  Other acne  -    RX: tretinoin (RETIN-A) 0.025 % cream; Use every night      Sincerely,    Betty Duncan MD      "

## 2018-05-23 ASSESSMENT — ANXIETY QUESTIONNAIRES: GAD7 TOTAL SCORE: 3

## 2018-05-23 ASSESSMENT — PATIENT HEALTH QUESTIONNAIRE - PHQ9: SUM OF ALL RESPONSES TO PHQ QUESTIONS 1-9: 3

## 2018-07-03 DIAGNOSIS — E03.8 OTHER SPECIFIED HYPOTHYROIDISM: ICD-10-CM

## 2018-07-03 RX ORDER — LEVOTHYROXINE SODIUM 100 UG/1
TABLET ORAL
Qty: 90 TABLET | Refills: 3 | OUTPATIENT
Start: 2018-07-03

## 2019-02-05 ENCOUNTER — MYC MEDICAL ADVICE (OUTPATIENT)
Dept: FAMILY MEDICINE | Facility: CLINIC | Age: 49
End: 2019-02-05

## 2019-02-05 DIAGNOSIS — F41.8 SITUATIONAL ANXIETY: ICD-10-CM

## 2019-02-07 ENCOUNTER — TELEPHONE (OUTPATIENT)
Dept: OBGYN | Facility: CLINIC | Age: 49
End: 2019-02-07

## 2019-02-07 RX ORDER — LORAZEPAM 0.5 MG/1
TABLET ORAL
Qty: 20 TABLET | Refills: 0 | Status: SHIPPED | OUTPATIENT
Start: 2019-02-07 | End: 2020-03-05

## 2019-02-07 NOTE — TELEPHONE ENCOUNTER
Received [employer] message from patient requesting refill of Ativan for upcoming trip. Pended Rx to [facility] for approval in Dr. Duncan's absence.    Patient also requesting thyroid and Lipid panel. Orders in for both -- did extend lipid panel order.    Patient notified via [employer].

## 2019-02-22 DIAGNOSIS — E03.8 OTHER SPECIFIED HYPOTHYROIDISM: ICD-10-CM

## 2019-02-22 DIAGNOSIS — E78.5 HYPERLIPIDEMIA LDL GOAL <130: ICD-10-CM

## 2019-02-22 LAB
CHOLEST SERPL-MCNC: 192 MG/DL
HDLC SERPL-MCNC: 79 MG/DL
LDLC SERPL CALC-MCNC: 99 MG/DL
NONHDLC SERPL-MCNC: 113 MG/DL
T3FREE SERPL-MCNC: 1.8 PG/ML (ref 2.3–4.2)
T4 FREE SERPL-MCNC: 1.11 NG/DL (ref 0.76–1.46)
TRIGL SERPL-MCNC: 70 MG/DL
TSH SERPL DL<=0.005 MIU/L-ACNC: 2.56 MU/L (ref 0.4–4)

## 2019-02-22 PROCEDURE — 80061 LIPID PANEL: CPT | Performed by: FAMILY MEDICINE

## 2019-02-22 PROCEDURE — 36415 COLL VENOUS BLD VENIPUNCTURE: CPT | Performed by: FAMILY MEDICINE

## 2019-02-22 PROCEDURE — 84443 ASSAY THYROID STIM HORMONE: CPT | Performed by: FAMILY MEDICINE

## 2019-02-22 PROCEDURE — 84481 FREE ASSAY (FT-3): CPT | Performed by: FAMILY MEDICINE

## 2019-02-22 PROCEDURE — 84439 ASSAY OF FREE THYROXINE: CPT | Performed by: FAMILY MEDICINE

## 2019-04-13 NOTE — PROGRESS NOTES
Shanna is a 49 year old female  that presents today with joint stiffness/pain concerns:  States she has had achy joints for years and every morning wakes with stiff finger and feet.  However, a recent concern was raised when after being on a whole 30 diet for a month on the first day off the diet she had a sudden onset of pain and swelling of the right index metacarpal [2019].  This swelling and pain has continued and because of the scattered joint pain in feet and hands she is worried about RA and wants a referral to rheumatology. She has also had episodic upper eye lid itching.     PMH:   1) Hypothyroidism: On levothyroxine 100 mcg   2) Hyperlipidemia:  with . Simvastatin 10 mg .     HCM: mammogram 2017;  Colonoscopy 2017;  ROS:  General: generally feeling well   Head/Eyes: none  Ears/Nose/Throat: none  Cardiovascular: none  Respiratory: none  Gastrointestinal: none  Breast: none  Genitourinary: none  Sexual Function: none  Musculoskeletal: Left hand joint pain,   Skin: none  Neurological: none  Mental Health: none  Endocrine: none  OB/GYN HISTORY:  1)  1 para 1002 with a twins, a boy and girl via . Her  has had a vasectomy. Her periods are heavier and more irregular. Every 1-2 months. No recent hot flashes.   Past Medical History:   Diagnosis Date     Constipation      Depressive disorder, not elsewhere classified     was on prozac in past, now on Wellbutrin     Hypothyroid      IBS (irritable bowel syndrome)     Better off gluten and dairy     Infectious mononucleosis      Other kyphoscoliosis and scoliosis     see surgical history - multiple surgeries     Pure hypercholesterolemia    PAST SURGICAL HISTORY:   Jorge placement in her teens for scoliosis  FAMILY HISTORY:   Mother with Hyperlipdemia  Life Style Modifiers:   Tobacco:  reports that she quit smoking about 19 years ago. She has a 4.00 pack-year smoking history. She has never used smokeless tobacco.    Alcohol:  reports that she drinks alcohol.   Drug use:  reports that she does not use drugs.  PAST SURGICAL HISTORY:  Past Surgical History:   Procedure Laterality Date     BIOPSY OF SKIN LESION       C  DELIVERY ONLY      , Low Cervical     C SPINE FUSION,POSTER,13+ SGMTS      fusions x2 and removal of rods x 2 and one for infection     HC OPEN TREATMENT CLAVICULAR FRACTURE INTERNAL FX      after non-union- left   FAMILY HISTORY:  Family History   Problem Relation Age of Onset     Lipids Mother      Lipids Maternal Grandmother      Lipids Maternal Grandfather      Lipids Maternal Aunt      Lipids Maternal Uncle      Asthma No family hx of      C.A.D. No family hx of      Diabetes No family hx of      Hypertension No family hx of      Breast Cancer No family hx of    SOCIAL HISTORY:  . 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   MEDICATIONS:  Current Outpatient Medications   Medication Sig Dispense Refill     B Complex Vitamins (B COMPLEX PO)        buPROPion (WELLBUTRIN XL) 300 MG 24 hr tablet Take 1 tablet (300 mg) by mouth every morning 90 tablet 3     ibuprofen (ADVIL,MOTRIN) 600 MG tablet Take 1 tablet by mouth 3 times daily as needed.       levothyroxine (SYNTHROID/LEVOTHROID) 100 MCG tablet Take 1 tablet (100 mcg) by mouth daily 90 tablet 4     LORazepam (ATIVAN) 0.5 MG tablet Take 30 minutes prior to departure.  Do not operate a vehicle after taking this medication 20 tablet 0     Omega-3 Fatty Acids (FISH OIL PO)        simvastatin (ZOCOR) 10 MG tablet Take 1 tablet (10 mg) by mouth At Bedtime 90 tablet 3     tretinoin (RETIN-A) 0.025 % cream Use every night 50 g 12     valACYclovir (VALTREX) 1000 mg tablet Take 1 tablet (1,000 mg) by mouth 2 times daily Take 1 tablet (1,000 mg) by mouth 2 times daily x 3-5 days during outbreak (Patient not taking: Reported on 2018) 30 tablet 3   ALLERGIES:  Nkda [no known drug allergies]  VITALS:  BP  "118/72   Pulse 65   Ht 1.727 m (5' 8\")   Wt 81.6 kg (179 lb 14.4 oz)   BMI 27.35 kg/m    PHYSICAL EXAM:  Constitutional: Well appearing woman in no acute distress.   Psychological: appropriate mood.  Eyes: anicteric, normal extra-ocular movement.  Musculoskeletal:  Right index metacarpal with swelling. No redness.   Skin: no concerning lesions, no jaundice.  Neurological: normal gait, no tremor.   Diagnoses and associated orders for this visit:  Arthralgia of right hand and scattered joint pain of feet  -     RHEUMATOLOGY REFERRAL  Acquired hypothyroidism  -     Increase levothyroxine (SYNTHROID/LEVOTHROID) to 112 MCG tablet as Last TSH was up and Ft3 was down.   Labs in future:   -     TSH; Future  -     T3 Free; Future  -     T4 free; Future  Hyperlipidemia LDL goal <100  -     Lipid Panel; Future          -    Continue with Simvastatin 10 mg   Minor depressive disorder  -Continue with buPROPion (WELLBUTRIN XL) 300 MG 24 hr tablet; Take 1 tablet (300 mg) by mouth every morning      "

## 2019-04-25 ENCOUNTER — OFFICE VISIT (OUTPATIENT)
Dept: FAMILY MEDICINE | Facility: CLINIC | Age: 49
End: 2019-04-25
Attending: FAMILY MEDICINE
Payer: COMMERCIAL

## 2019-04-25 VITALS
SYSTOLIC BLOOD PRESSURE: 118 MMHG | HEIGHT: 68 IN | HEART RATE: 65 BPM | BODY MASS INDEX: 27.26 KG/M2 | DIASTOLIC BLOOD PRESSURE: 72 MMHG | WEIGHT: 179.9 LBS

## 2019-04-25 DIAGNOSIS — M25.541 ARTHRALGIA OF RIGHT HAND: Primary | ICD-10-CM

## 2019-04-25 DIAGNOSIS — E78.5 HYPERLIPIDEMIA LDL GOAL <100: ICD-10-CM

## 2019-04-25 DIAGNOSIS — E03.9 ACQUIRED HYPOTHYROIDISM: ICD-10-CM

## 2019-04-25 PROCEDURE — G0463 HOSPITAL OUTPT CLINIC VISIT: HCPCS | Mod: ZF

## 2019-04-25 RX ORDER — LEVOTHYROXINE SODIUM 112 UG/1
112 TABLET ORAL DAILY
Qty: 90 TABLET | Refills: 3 | Status: SHIPPED | OUTPATIENT
Start: 2019-04-25 | End: 2020-03-05

## 2019-04-25 ASSESSMENT — MIFFLIN-ST. JEOR: SCORE: 1494.52

## 2019-04-25 ASSESSMENT — PAIN SCALES - GENERAL: PAINLEVEL: NO PAIN (0)

## 2019-04-25 NOTE — PATIENT INSTRUCTIONS
Your provider has referred you to: [phone] [U]      - [facility] in [place]:  Phone: [phone]

## 2019-04-25 NOTE — LETTER
RE: Shanna Kang  [address] [facility] [address]     Dear Colleague,    Thank you for referring your patient, Shanna Kang, to the [facility] at [facility]. Please see a copy of my visit note below.    Shanna is a 49 year old female  that presents today with joint stiffness/pain concerns:  States she has had achy joints for years and every morning wakes with stiff finger and feet.  However, a recent concern was raised when after being on a whole 30 diet for a month on the first day off the diet she had a sudden onset of pain and swelling of the right index metacarpal [2019].  This swelling and pain has continued and because of the scattered joint pain in feet and hands she is worried about RA and wants a referral to rheumatology. She has also had episodic upper eye lid itching.     PMH:   1) Hypothyroidism: On levothyroxine 100 mcg   2) Hyperlipidemia:  with . Simvastatin 10 mg .     HCM: mammogram 2017;  Colonoscopy 2017;  OB/GYN HISTORY:  1)  1 para 1002 with a twins, a boy and girl via . Her  has had a vasectomy. Her periods are heavier and more irregular. Every 1-2 months. No recent hot flashes.   Past Medical History:   Diagnosis Date     Constipation      Depressive disorder, not elsewhere classified     was on prozac in past, now on Wellbutrin     Hypothyroid      IBS (irritable bowel syndrome)     Better off gluten and dairy     Infectious mononucleosis      Other kyphoscoliosis and scoliosis     see surgical history - multiple surgeries     Pure hypercholesterolemia    PAST SURGICAL HISTORY:   Jorge placement in her teens for scoliosis  FAMILY HISTORY:   Mother with Hyperlipdemia  Life Style Modifiers:   Tobacco:  reports that she quit smoking about 19 years ago. She has a 4.00 pack-year smoking history. She has never used smokeless tobacco.   Alcohol:  reports that she  drinks alcohol.   Drug use:  reports that she does not use drugs.  PAST SURGICAL HISTORY:  Past Surgical History:   Procedure Laterality Date     BIOPSY OF SKIN LESION       C  DELIVERY ONLY      , Low Cervical     C SPINE FUSION,POSTER,13+ SGMTS      fusions x2 and removal of rods x 2 and one for infection     HC OPEN TREATMENT CLAVICULAR FRACTURE INTERNAL FX      after non-union- left   FAMILY HISTORY:  Family History   Problem Relation Age of Onset     Lipids Mother      Lipids Maternal Grandmother      Lipids Maternal Grandfather      Lipids Maternal Aunt      Lipids Maternal Uncle      Asthma No family hx of      C.A.D. No family hx of      Diabetes No family hx of      Hypertension No family hx of      Breast Cancer No family hx of    SOCIAL HISTORY:  . 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   MEDICATIONS:  Current Outpatient Medications   Medication Sig Dispense Refill     B Complex Vitamins (B COMPLEX PO)        buPROPion (WELLBUTRIN XL) 300 MG 24 hr tablet Take 1 tablet (300 mg) by mouth every morning 90 tablet 3     ibuprofen (ADVIL,MOTRIN) 600 MG tablet Take 1 tablet by mouth 3 times daily as needed.       levothyroxine (SYNTHROID/LEVOTHROID) 100 MCG tablet Take 1 tablet (100 mcg) by mouth daily 90 tablet 4     LORazepam (ATIVAN) 0.5 MG tablet Take 30 minutes prior to departure.  Do not operate a vehicle after taking this medication 20 tablet 0     Omega-3 Fatty Acids (FISH OIL PO)        simvastatin (ZOCOR) 10 MG tablet Take 1 tablet (10 mg) by mouth At Bedtime 90 tablet 3     tretinoin (RETIN-A) 0.025 % cream Use every night 50 g 12     valACYclovir (VALTREX) 1000 mg tablet Take 1 tablet (1,000 mg) by mouth 2 times daily Take 1 tablet (1,000 mg) by mouth 2 times daily x 3-5 days during outbreak (Patient not taking: Reported on 2018) 30 tablet 3   ALLERGIES:  Nkda [no known drug allergies]  VITALS:  /72   Pulse 65   Ht 1.727 m  "(5' 8\")   Wt 81.6 kg (179 lb 14.4 oz)   BMI 27.35 kg/m     PHYSICAL EXAM:  Constitutional: Well appearing woman in no acute distress.   Psychological: appropriate mood.  Eyes: anicteric, normal extra-ocular movement.  Musculoskeletal:  Right index metacarpal with swelling. No redness.   Skin: no concerning lesions, no jaundice.  Neurological: normal gait, no tremor.   Diagnoses and associated orders for this visit:  Arthralgia of right hand and scattered joint pain of feet  -     RHEUMATOLOGY REFERRAL  Acquired hypothyroidism  -     Increase levothyroxine (SYNTHROID/LEVOTHROID) to 112 MCG tablet as Last TSH was up and Ft3 was down.   Labs in future:   -     TSH; Future  -     T3 Free; Future  -     T4 free; Future  Hyperlipidemia LDL goal <100  -     Lipid Panel; Future          -    Continue with Simvastatin 10 mg   Minor depressive disorder  -Continue with buPROPion (WELLBUTRIN XL) 300 MG 24 hr tablet; Take 1 tablet (300 mg) by mouth every morning    Again, thank you for allowing me to participate in the care of your patient.      Sincerely,    Betty Duncan MD      "

## 2019-05-25 DIAGNOSIS — E78.5 HYPERLIPIDEMIA LDL GOAL <130: ICD-10-CM

## 2019-05-29 DIAGNOSIS — F32.89 MINOR DEPRESSIVE DISORDER: ICD-10-CM

## 2019-05-30 RX ORDER — SIMVASTATIN 10 MG
10 TABLET ORAL AT BEDTIME
Qty: 90 TABLET | Refills: 3 | Status: SHIPPED | OUTPATIENT
Start: 2019-05-30 | End: 2020-06-04 | Stop reason: DRUGHIGH

## 2019-05-30 RX ORDER — BUPROPION HYDROCHLORIDE 300 MG/1
300 TABLET ORAL EVERY MORNING
Qty: 90 TABLET | Refills: 3 | Status: SHIPPED | OUTPATIENT
Start: 2019-05-30 | End: 2020-05-26

## 2019-05-30 NOTE — TELEPHONE ENCOUNTER
Received refill request for Simvastatin.  Last in clinic April 2019.  Per visit note, continue medication. Refill provided.

## 2019-05-30 NOTE — TELEPHONE ENCOUNTER
Received refill request for wellbutrin. Patient had recent annual where this was discussed to be continued. rx sent.

## 2019-06-17 ENCOUNTER — TRANSFERRED RECORDS (OUTPATIENT)
Dept: HEALTH INFORMATION MANAGEMENT | Facility: CLINIC | Age: 49
End: 2019-06-17

## 2019-07-05 ENCOUNTER — HOSPITAL ENCOUNTER (OUTPATIENT)
Dept: GENERAL RADIOLOGY | Facility: CLINIC | Age: 49
Discharge: HOME | End: 2019-07-05
Attending: INTERNAL MEDICINE | Admitting: INTERNAL MEDICINE
Payer: COMMERCIAL

## 2019-07-05 ENCOUNTER — HOSPITAL ENCOUNTER (OUTPATIENT)
Dept: GENERAL RADIOLOGY | Facility: CLINIC | Age: 49
End: 2019-07-05
Attending: INTERNAL MEDICINE
Payer: COMMERCIAL

## 2019-07-05 DIAGNOSIS — M79.673 PAIN IN FOOT: ICD-10-CM

## 2019-07-05 DIAGNOSIS — M79.643 PAIN IN HAND: ICD-10-CM

## 2019-07-05 PROCEDURE — 73620 X-RAY EXAM OF FOOT: CPT | Mod: 50

## 2019-07-05 PROCEDURE — 73120 X-RAY EXAM OF HAND: CPT | Mod: 50

## 2019-07-19 DIAGNOSIS — E03.8 OTHER SPECIFIED HYPOTHYROIDISM: ICD-10-CM

## 2019-07-19 RX ORDER — LEVOTHYROXINE SODIUM 100 UG/1
100 TABLET ORAL DAILY
Qty: 90 TABLET | Refills: 4 | OUTPATIENT
Start: 2019-07-19

## 2019-07-19 NOTE — TELEPHONE ENCOUNTER
Received refill request for 100mcg of levothyroxine. Patient should be taking 112mcg and this was sent in April. Talked with pharmacy to and cleared up the mistake. Pharmacist will fill the 112mcg dose.

## 2019-07-22 ENCOUNTER — TRANSFERRED RECORDS (OUTPATIENT)
Dept: HEALTH INFORMATION MANAGEMENT | Facility: CLINIC | Age: 49
End: 2019-07-22

## 2019-09-30 ENCOUNTER — HEALTH MAINTENANCE LETTER (OUTPATIENT)
Age: 49
End: 2019-09-30

## 2019-10-31 ENCOUNTER — OFFICE VISIT (OUTPATIENT)
Dept: DERMATOLOGY | Facility: CLINIC | Age: 49
End: 2019-10-31
Payer: COMMERCIAL

## 2019-10-31 DIAGNOSIS — D22.9 MULTIPLE BENIGN NEVI: Primary | ICD-10-CM

## 2019-10-31 DIAGNOSIS — L82.1 SEBORRHEIC KERATOSIS: ICD-10-CM

## 2019-10-31 DIAGNOSIS — L81.4 SOLAR LENTIGO: ICD-10-CM

## 2019-10-31 ASSESSMENT — PAIN SCALES - GENERAL: PAINLEVEL: NO PAIN (0)

## 2019-10-31 NOTE — NURSING NOTE
"Chief Complaint   Patient presents with     Skin Check     Shanna is here today for a Skin Check. Shanna notes \" a few spots \" she would like looked at.      Zoe Masters LPN    "

## 2019-10-31 NOTE — LETTER
"10/31/2019       RE: Shanna Kang  [address] [address]     Dear Colleague,    Thank you for referring your patient, Shanna Kang, to the [facility] DERMATOLOGY at [facility]. Please see a copy of my visit note below.    [facility] Dermatology Note      Dermatology Problem List:  1. Multiple benign nevi.   2. SKs, solar lentigos.   3. History of indoor tanning.     CC:   Chief Complaint   Patient presents with     Skin Check     Shanna is here today for a Skin Check. Shanna notes \" a few spots \" she would like looked at.      Encounter Date: Oct 31, 2019    History of Present Illness:    Ms. Shanna Kang is a 49 year old female who presents for full body skin examination. She has noticed a few changing moles on her left upper chest and arm.    The patient otherwise denies any new or concerning lesions. No bleeding, painful, pruritic, or changing lesions. They report no personal or family history of skin cancer. She has had some nevi biopsied in the past, but these were all normal. No history of immunosuppression. There is a history of indoor tanning, about 100 sessions in lifetime in teenage years. They do use sunscreen and protective clothing when outdoors for sun protection. No occupational exposure to ultraviolet light or other forms of radiation. 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 Health otherwise stable. No other skin concerns.     Past Medical History:   Patient Active Problem List   Diagnosis     Allergic rhinitis due to pollen     Depressive disorder, not elsewhere classified     Other acne     ISOLATED OR SPECIFIC PHOBIAS NEC- flying      Hyperlipidemia LDL goal <130     Hypothyroidism     Constipation     Scoliosis     Low back pain     Menstrual irregularity     Past Medical History:   Diagnosis Date     Constipation      Depressive disorder, not elsewhere classified     was on prozac in past, now on " Wellbutrin     Hypothyroid      IBS (irritable bowel syndrome)     Better off gluten and dairy     Infectious mononucleosis      Other kyphoscoliosis and scoliosis     see surgical history - multiple surgeries     Pure hypercholesterolemia      Past Surgical History:   Procedure Laterality Date     BIOPSY OF SKIN LESION       C  DELIVERY ONLY      , Low Cervical     C SPINE FUSION,POSTER,13+ SGMTS      fusions x2 and removal of rods x 2 and one for infection     HC OPEN TREATMENT CLAVICULAR FRACTURE INTERNAL FX      after non-union- left       Social History:  Patient reports that she quit smoking about 20 years ago. She has a 4.00 pack-year smoking history. She has never used smokeless tobacco. She reports current alcohol use. She reports that she does not use drugs.    Family History:  Family History   Problem Relation Age of Onset     Lipids Mother      Lipids Maternal Grandmother      Lipids Maternal Grandfather      Lipids Maternal Aunt      Lipids Maternal Uncle      Asthma No family hx of      C.A.D. No family hx of      Diabetes No family hx of      Hypertension No family hx of      Breast Cancer No family hx of        Medications:  Current Outpatient Medications   Medication Sig Dispense Refill     B Complex Vitamins (B COMPLEX PO)        buPROPion (WELLBUTRIN XL) 300 MG 24 hr tablet TAKE 1 TABLET (300 MG) BY MOUTH EVERY MORNING 90 tablet 3     ibuprofen (ADVIL,MOTRIN) 600 MG tablet Take 1 tablet by mouth 3 times daily as needed.       levothyroxine (SYNTHROID/LEVOTHROID) 112 MCG tablet Take 1 tablet (112 mcg) by mouth daily 90 tablet 3     Omega-3 Fatty Acids (FISH OIL PO)        simvastatin (ZOCOR) 10 MG tablet TAKE 1 TABLET (10 MG) BY MOUTH AT BEDTIME 90 tablet 3     levothyroxine (SYNTHROID/LEVOTHROID) 100 MCG tablet Take 1 tablet (100 mcg) by mouth daily (Patient not taking: Reported on 10/31/2019) 90 tablet 4     LORazepam (ATIVAN) 0.5 MG tablet Take 30 minutes prior to departure.   Do not operate a vehicle after taking this medication (Patient not taking: Reported on 4/25/2019) 20 tablet 0     valACYclovir (VALTREX) 1000 mg tablet Take 1 tablet (1,000 mg) by mouth 2 times daily Take 1 tablet (1,000 mg) by mouth 2 times daily x 3-5 days during outbreak (Patient not taking: Reported on 4/25/2019) 30 tablet 3     Allergies   Allergen Reactions     Nkda [No Known Drug Allergies]          Review of Systems:  -Skin Establ Pt: The patient denies any new rash, pruritus, or lesions that are symptomatic, changing or bleeding, except as per HPI.  -Constitutional: Otherwise feeling well today, in usual state of health.  -HEENT: Patient denies nonhealing oral sores.  -Skin: As above in HPI. No additional skin concerns.    Physical exam:  Vitals: There were no vitals taken for this visit.  GEN: This is a well developed, well-nourished female in no acute distress, in a pleasant mood.    SKIN: Full skin, which includes the head/face, both arms, chest, back, abdomen,both legs, genitalia and/or groin buttocks, digits and/or nails, was examined.  - Odell skin type: II  - Brown macules and papules on face, trunk, and extremities with normal symmetric reticular pigment network appreciated under dermoscopy.  - Few light brown, well-demarcated papules on trunk and extremities.  - Brown macules on sun exposed areas.   - No other lesions of concern on areas examined.     Impression/Plan:    1. Multiple clinically benign nevi on the trunk and extremities.     ABCDs of melanoma were discussed and self skin checks were advised.     Sun precaution was advised including the use of sun screens of SPF 30 or higher, sun protective clothing, and avoidance of tanning beds.    Given history of indoor tanning, recommended routing screening in 1 year; then likely spaced every 2 years if exam stable.     2. Benign lesions: Seborrheic keratoses, solar lentigos. Explained to patient benign nature of lesion. No treatment is  necessary at this time unless the lesion changes or becomes symptomatic.     Follow-up in 1 year, earlier for new or changing lesions.     Staff Involved:  Staff Only    Pasquale Nunes MD    Department of Dermatology  [facility]: Phone: [phone], Fax:[phone]  [facility]: Phone: [phone], Fax: [phone]

## 2019-10-31 NOTE — PROGRESS NOTES
"[facility] Dermatology Note      Dermatology Problem List:  1. Multiple benign nevi.   2. SKs, solar lentigos.   3. History of indoor tanning.     CC:   Chief Complaint   Patient presents with     Skin Check     Shanna is here today for a Skin Check. Shanna notes \" a few spots \" she would like looked at.      Encounter Date: Oct 31, 2019    History of Present Illness:    Ms. Shanna Kang is a 49 year old female who presents for full body skin examination. She has noticed a few changing moles on her left upper chest and arm.    The patient otherwise denies any new or concerning lesions. No bleeding, painful, pruritic, or changing lesions. They report no personal or family history of skin cancer. She has had some nevi biopsied in the past, but these were all normal. No history of immunosuppression. There is a history of indoor tanning, about 100 sessions in lifetime in teenage years. They do use sunscreen and protective clothing when outdoors for sun protection. No occupational exposure to ultraviolet light or other forms of radiation. 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 Health otherwise stable. No other skin concerns.     Past Medical History:   Patient Active Problem List   Diagnosis     Allergic rhinitis due to pollen     Depressive disorder, not elsewhere classified     Other acne     ISOLATED OR SPECIFIC PHOBIAS NEC- flying      Hyperlipidemia LDL goal <130     Hypothyroidism     Constipation     Scoliosis     Low back pain     Menstrual irregularity     Past Medical History:   Diagnosis Date     Constipation      Depressive disorder, not elsewhere classified     was on prozac in past, now on Wellbutrin     Hypothyroid      IBS (irritable bowel syndrome)     Better off gluten and dairy     Infectious mononucleosis 2001     Other kyphoscoliosis and scoliosis     see surgical history - multiple surgeries     Pure hypercholesterolemia      Past Surgical History:   Procedure " Laterality Date     BIOPSY OF SKIN LESION       C  DELIVERY ONLY      , Low Cervical     C SPINE FUSION,POSTER,13+ SGMTS      fusions x2 and removal of rods x 2 and one for infection     HC OPEN TREATMENT CLAVICULAR FRACTURE INTERNAL FX      after non-union- left       Social History:  Patient reports that she quit smoking about 20 years ago. She has a 4.00 pack-year smoking history. She has never used smokeless tobacco. She reports current alcohol use. She reports that she does not use drugs.    Family History:  Family History   Problem Relation Age of Onset     Lipids Mother      Lipids Maternal Grandmother      Lipids Maternal Grandfather      Lipids Maternal Aunt      Lipids Maternal Uncle      Asthma No family hx of      C.A.D. No family hx of      Diabetes No family hx of      Hypertension No family hx of      Breast Cancer No family hx of        Medications:  Current Outpatient Medications   Medication Sig Dispense Refill     B Complex Vitamins (B COMPLEX PO)        buPROPion (WELLBUTRIN XL) 300 MG 24 hr tablet TAKE 1 TABLET (300 MG) BY MOUTH EVERY MORNING 90 tablet 3     ibuprofen (ADVIL,MOTRIN) 600 MG tablet Take 1 tablet by mouth 3 times daily as needed.       levothyroxine (SYNTHROID/LEVOTHROID) 112 MCG tablet Take 1 tablet (112 mcg) by mouth daily 90 tablet 3     Omega-3 Fatty Acids (FISH OIL PO)        simvastatin (ZOCOR) 10 MG tablet TAKE 1 TABLET (10 MG) BY MOUTH AT BEDTIME 90 tablet 3     levothyroxine (SYNTHROID/LEVOTHROID) 100 MCG tablet Take 1 tablet (100 mcg) by mouth daily (Patient not taking: Reported on 10/31/2019) 90 tablet 4     LORazepam (ATIVAN) 0.5 MG tablet Take 30 minutes prior to departure.  Do not operate a vehicle after taking this medication (Patient not taking: Reported on 2019) 20 tablet 0     valACYclovir (VALTREX) 1000 mg tablet Take 1 tablet (1,000 mg) by mouth 2 times daily Take 1 tablet (1,000 mg) by mouth 2 times daily x 3-5 days during outbreak  (Patient not taking: Reported on 4/25/2019) 30 tablet 3     Allergies   Allergen Reactions     Nkda [No Known Drug Allergies]          Review of Systems:  -Skin Establ Pt: The patient denies any new rash, pruritus, or lesions that are symptomatic, changing or bleeding, except as per HPI.  -Constitutional: Otherwise feeling well today, in usual state of health.  -HEENT: Patient denies nonhealing oral sores.  -Skin: As above in HPI. No additional skin concerns.    Physical exam:  Vitals: There were no vitals taken for this visit.  GEN: This is a well developed, well-nourished female in no acute distress, in a pleasant mood.    SKIN: Full skin, which includes the head/face, both arms, chest, back, abdomen,both legs, genitalia and/or groin buttocks, digits and/or nails, was examined.  - Odell skin type: II  - Brown macules and papules on face, trunk, and extremities with normal symmetric reticular pigment network appreciated under dermoscopy.  - Few light brown, well-demarcated papules on trunk and extremities.  - Brown macules on sun exposed areas.   - No other lesions of concern on areas examined.     Impression/Plan:    1. Multiple clinically benign nevi on the trunk and extremities.     ABCDs of melanoma were discussed and self skin checks were advised.     Sun precaution was advised including the use of sun screens of SPF 30 or higher, sun protective clothing, and avoidance of tanning beds.    Given history of indoor tanning, recommended routing screening in 1 year; then likely spaced every 2 years if exam stable.     2. Benign lesions: Seborrheic keratoses, solar lentigos. Explained to patient benign nature of lesion. No treatment is necessary at this time unless the lesion changes or becomes symptomatic.     Follow-up in 1 year, earlier for new or changing lesions.     Staff Involved:  Staff Only    Pasquale Nunes MD    Department of Dermatology  [facility]  [facility]: Phone: [phone], Fax:[phone]  [facility]: Phone: [phone], Fax: [phone]

## 2019-11-12 ENCOUNTER — TRANSFERRED RECORDS (OUTPATIENT)
Dept: HEALTH INFORMATION MANAGEMENT | Facility: CLINIC | Age: 49
End: 2019-11-12

## 2020-03-02 DIAGNOSIS — F41.8 SITUATIONAL ANXIETY: ICD-10-CM

## 2020-03-02 DIAGNOSIS — E03.9 ACQUIRED HYPOTHYROIDISM: ICD-10-CM

## 2020-03-05 RX ORDER — LEVOTHYROXINE SODIUM 112 UG/1
112 TABLET ORAL DAILY
Qty: 60 TABLET | Refills: 0 | Status: SHIPPED | OUTPATIENT
Start: 2020-03-05 | End: 2020-05-01

## 2020-03-05 NOTE — TELEPHONE ENCOUNTER
Received refill request for levothyroxine. Discussed with patient, she has upcoming appt with Dr. fulton to establish care now that Dakota is retired. She does need a temporary supply of levothyroxine and a short refill of ativan (she uses for flying) and has a flight coming up. Nurse sent levothyroxine and will pend Ativan to Teresa to approve.

## 2020-03-06 RX ORDER — LORAZEPAM 0.5 MG/1
TABLET ORAL
Qty: 10 TABLET | Refills: 0 | Status: SHIPPED | OUTPATIENT
Start: 2020-03-06 | End: 2024-02-25

## 2020-03-17 ENCOUNTER — OFFICE VISIT - HEALTHEAST (OUTPATIENT)
Dept: INTERNAL MEDICINE | Facility: CLINIC | Age: 50
End: 2020-03-17

## 2020-03-17 ENCOUNTER — VIRTUAL VISIT (OUTPATIENT)
Dept: FAMILY MEDICINE | Facility: OTHER | Age: 50
End: 2020-03-17

## 2020-03-17 DIAGNOSIS — R05.9 COUGH: ICD-10-CM

## 2020-03-17 NOTE — PROGRESS NOTES
"Date: 2020 21:57:11  Clinician: Rosa Briscoe  Clinician NPI: [id number]  Patient: Shanna Kang  Patient : 1970  Patient Address: [address] [place] [address]  Patient Phone: [phone]  Visit Protocol: URI  Patient Summary:  Sahnna is a 50 year old ( : 1970 ) female who initiated a Visit for COVID-19 (Coronavirus) evaluation and screening. When asked the question \"Please sign me up to receive news, health information and promotions from [employer].\", Shanna responded \"No\".    Shanna states her symptoms started 1-2 days ago.   Her symptoms consist of ear pain, malaise, and a headache.   Symptom details   Headache: She states the headache is mild (1-3 on a 10 point pain scale).    Shanna denies having wheezing, sore throat, cough, nasal congestion, teeth pain, fever, chills, rhinitis, enlarged lymph nodes, facial pain or pressure, and myalgias. She also denies taking antibiotic medication for the symptoms and having recent facial or sinus surgery in the past 60 days. She is not experiencing dyspnea.    Pertinent COVID-19 (Coronavirus) information  Shanna has traveled internationally or to the areas where COVID-19 (Coronavirus) is widespread in the last 14 days before the start of her symptoms. Countries or locations traveled as reported by the patient (free text): [place]   Shanna has not had a close contact with a laboratory-confirmed COVID-19 patient within 14 days of symptom onset. She also has not had a close contact with a suspected COVID-19 patient within 14 days of symptom onset.   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   Pertinent medical history  Shanna typically gets a yeast infection when she takes antibiotics. She has used fluconazole (Diflucan) to treat previous yeast infections. 2 doses of fluconazole (Diflucan) has typically been needed for symptoms to resolve in the past.  Shanna needs a return to work/school note.   Weight: 170 lbs   Shanna does " not smoke or use smokeless tobacco.   She denies pregnancy and denies breastfeeding. She is currently menstruating.   Additional information as reported by the patient (free text): I do not have a sore throat, but maybe a thick throat.  My eyes have been watering more than normal for the last two days.  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   Weight: 170 lbs    MEDICATIONS: Probiotic-Digestive Enzymes oral, Fish Oil oral, levothyroxine (bulk), simvastatin oral, Wellbutrin XL oral, ALLERGIES: NKDA  Clinician Response:  Dear Shanna,  I am sorry you are not feeling well. Your health is our priority. Based on the information you have provided, it is possible that you may have some type of viral infection.  Please read the full treatment plan and see my recommendations below.  Medication information  Because you have a viral infection, antibiotics will not help you get better. Treating a viral infection with antibiotics could actually make you feel worse.  Self care  The following tips will keep you as comfortable as possible while you recover:     Rest    Drink plenty of water and other liquids     Additional treatment plan   Based on the information you have provided, it is recommended that you go to one of our designated Coronavirus (Covid-19) testing centers to get a test done from your car.  We are offering testing from your car in [facility], [place], [facility] and [place].   To schedule a visit at [facility] or [place], please call [phone] to find out when the next available testing window is. Testing will be done in 1 hour blocks so that you can wait at home until we are available to more quickly perform your testing from the car.   To complete testing in [place] ONLY, follow the instructions below:    Go as soon as  possible during the hours noted to [facility] ([facility]) [address], [place] [address]. Hours: M-F 7:30am-5pm    What to expect:   When you arrive please come park in the parking lot.   Be prepared to present photo ID   Call [phone] and let them know you have arrived.    They will ask for information to get you registered for a visit and will ask for the description of your car and where you are parked.    They will add you to the queue to get your test (you will stay in your car the entire time).   You will then be met by a provider who will perform a brief assessment in your car and collect samples to test for coronavirus and possibly influenza or RSV.    Isolate yourself while traveling.  Do Not allow any visitors within 6 feet.  Do Not go to work or school.  Do Not go to [employer],  centers, shopping, or other public places.  Do Not shake hands.  Avoid close contact with others (hugging, kissing).Protect Others:     Cover Your Mouth and Nose with a mask, disposable tissue or wash cloth to avoid spreading germs to others.  Wash your hands and face frequently with soap and water   Fever Medicines:    For fever relief, take acetaminophen or ibuprofen.  Treat fevers above 101deg F (38.3deg C) to lower fevers and make you more comfortable.   Acetaminophen (e.g., Tylenol): Take 650 mg (two 325 mg pills) by mouth every 4-6 hours as needed of regular strength Tylenol or 1,000 mg (two 500 mg pills) every 8 hours as needed of Extra Strength Tylenol.   Ibuprofen (e.g., Motrin, Advil): Take 400 mg (two 200 mg pills) by mouth every 6 hours as needed.   Acetaminophen is thought to be safer than ibuprofen or naproxen for people over 65 years old. Acetaminophen is in many OTC and prescription medicines. It might be in more than one medicine that you are taking. You need to be careful and not take an overdose. Before taking any medicine, read all the instructions on the package.   Caution -NSAIDs (e.g., ibuprofen, naproxen): Do not take nonsteroidal anti-inflammatory drugs (NSAIDs) if you have stomach problems, kidney disease, heart failure, or other contraindications to using this type of medicine. Do not take NSAID medicines for over 7 days without consulting your PCP. Do not take NSAID medicines if you are pregnant. Do not take NSAID medicines if you are also taking blood thinners.    Call or submit a new visit if: Breathing difficulty develops or you become worse.  Thank you for limiting contact with others, wearing a simple mask to cover your cough, practice good hand hygiene habits and accessing our virtual services where possible to limit the spread of this virus.  For more information about COVID19 and options for caring for yourself at home, please visit the CDC website at https://www.cdc.gov/coronavirus/2019-ncov/about/steps-when-sick.html  For more options for care at [facility], please visit our website at [email or web address]    COVID-19 (Coronavirus) General Information  With the increase in the number of COVID-19 (Coronavirus) cases, we understand you may have some questions. Below is some helpful information on COVID-19 (Coronavirus).  How can I protect myself and others from the COVID-19 (Coronavirus)?  Because there is currently no vaccine to prevent infection, the best way to protect yourself is to avoid being exposed to this virus. Put distance between yourself and other people if COVID-19 (Coronavirus) is spreading in your community. The virus is thought to spread mainly from person-to-person.     Between people who are in close contact with one another (within about 6 about) for prolonged period (10 minutes or longer).    Through respiratory droplets produced when an infected person coughs or sneezes.     The CDC recommends the following additional steps to protect yourself and others:     Wash your hands often with soap and water for  at least 20 seconds, especially after blowing your nose, coughing, or sneezing; going to the bathroom; and before eating or preparing food.  Use an alcohol-based hand  that contains at least 60 percent alcohol if soap and water are not available.        Avoid touching your eyes, nose and mouth with unwashed hands.    Avoid close contact with people who are sick.    Stay home when you are sick.    Cover your cough or sneeze with a tissue, then throw the tissue in the trash.    Clean and disinfect frequently touched objects and surfaces.     You can help stop COVID-19 (Coronavirus) by knowing the signs and symptoms:     Fever    Cough    Shortness of breath     Contact your healthcare provider if   Develop symptoms   AND   Have been in close contact with a person known to have COVID-19 (Coronavirus) or live in or have recently traveled from an area with ongoing spread of COVID-19 (Coronavirus). Call ahead before you go to a doctor's office or emergency room. Tell them about your recent travel and your symptoms.   For the most up to date information, visit the CDC's website.  Steps to help prevent the spread of COVID-19 (Coronavirus) if you are sick  If you are sick with COVID-19 (Coronavirus) or suspect you are infected with the virus that causes COVID-19 (Coronavirus), follow the steps below to help prevent the disease from spreading&nbsp;to people in your home and community.     Stay home except to get medical care. Home isolation may be started in consultation with your healthcare clinician.    Separate yourself from other people and animals in your home.    Call ahead before visiting your doctor if you have a medical appointment.    Wear a facemask when you are around other people.    Cover your cough and sneezes.    Clean your hands often.    Avoid sharing personal household items.    Clean and disinfect frequently touched objects and surfaces everyday.    You will need to have someone drop off  "medications or household supplies (if needed) at your house without coming inside or in contact with you or others living in your house.    Monitor your symptoms and seek prompt medical care if your illness is worsening (e.g. Difficulty breathing).    Discontinue home isolation only in consultation with your healthcare provider.     For more detailed and up to date information on what to do if you are sick, visit this link: What to Do If You Are Sick With Coronavirus Disease 2019 (COVID-19).  Do I need to be tested for COVID-19 (Coronavirus)?     At this time, the limited number of tests available are controlled by the state and local health departments and are being reserved for more seriously ill patients, those with known exposure to confirmed patients, and those with recent travel (within 14 days) to countries with high rates of COVID-19 (Coronavirus).    Decisions on which patients receive testing will be based on the local spread of COVID-19 (Coronavirus) as well as the symptoms. Your healthcare provider will make the final decision on whether you should be tested.    In the meantime, if you have concerns that you may have been exposed, it is reasonable to practice \"social distancing.\"&nbsp; If you are ill with a cold or flu-like illness, please monitor your symptoms and reach out to your healthcare provider if your symptoms worsen.    For more up to date information, visit this link: COVID-19 (Coronavirus) Frequently Asked Questions and Answers.      Diagnosis: Other malaise  Diagnosis ICD: R53.81  "

## 2020-03-22 ENCOUNTER — COMMUNICATION - HEALTHEAST (OUTPATIENT)
Dept: SCHEDULING | Facility: CLINIC | Age: 50
End: 2020-03-22

## 2020-04-21 ENCOUNTER — VIRTUAL VISIT (OUTPATIENT)
Dept: FAMILY MEDICINE | Facility: CLINIC | Age: 50
End: 2020-04-21
Payer: COMMERCIAL

## 2020-04-21 DIAGNOSIS — Z00.00 PREVENTATIVE HEALTH CARE: Primary | ICD-10-CM

## 2020-04-21 DIAGNOSIS — K90.41 NON-CELIAC GLUTEN SENSITIVITY: ICD-10-CM

## 2020-04-21 DIAGNOSIS — F32.89 MINOR DEPRESSIVE DISORDER: ICD-10-CM

## 2020-04-21 DIAGNOSIS — Z12.31 VISIT FOR SCREENING MAMMOGRAM: ICD-10-CM

## 2020-04-21 DIAGNOSIS — E78.5 HYPERLIPIDEMIA LDL GOAL <130: ICD-10-CM

## 2020-04-21 DIAGNOSIS — K58.2 IRRITABLE BOWEL SYNDROME WITH BOTH CONSTIPATION AND DIARRHEA: ICD-10-CM

## 2020-04-21 DIAGNOSIS — E03.9 ACQUIRED HYPOTHYROIDISM: ICD-10-CM

## 2020-04-21 DIAGNOSIS — K90.49 DAIRY PRODUCT INTOLERANCE: ICD-10-CM

## 2020-04-21 SDOH — HEALTH STABILITY: MENTAL HEALTH: HOW OFTEN DO YOU HAVE A DRINK CONTAINING ALCOHOL?: 4 OR MORE TIMES A WEEK

## 2020-04-21 SDOH — HEALTH STABILITY: MENTAL HEALTH: HOW MANY STANDARD DRINKS CONTAINING ALCOHOL DO YOU HAVE ON A TYPICAL DAY?: 1 OR 2

## 2020-04-21 NOTE — PROGRESS NOTES
"Shanna Kang is a 50 year old female who is being evaluated via a billable telephone visit.      The patient has been notified of following:     \"This telephone visit will be conducted via a call between you and your physician/provider. We have found that certain health care needs can be provided without the need for a physical exam.  This service lets us provide the care you need with a short phone conversation.  If a prescription is necessary we can send it directly to your pharmacy.  If lab work is needed we can place an order for that and you can then stop by our lab to have the test done at a later time.    Telephone visits are billed at different rates depending on your insurance coverage. During this emergency period, for some insurers they may be billed the same as an in-person visit.  Please reach out to your insurance provider with any questions.    If during the course of the call the physician/provider feels a telephone visit is not appropriate, you will not be charged for this service.\"    Patient has given verbal consent for Telephone visit?  Yes    HPI  Shanna Kang is a 50 year old female who is a former patient of Dr. Duncan's. She wishes to [place] [facility] [place]. She has history of depression, hyperlipidemia, hypothyroidism, scoliosis, seasonal allergies and irregular menses.      Hyperlipidemia LDL goal <130  Shanna is a 49 yo woman who currently takes Simvastatin 10mg dose daily. She was started on this medication about 5 yr ago. No previous hx of chest pain, palpitations or exercise intolerance. Former smoker, 4 pk yr hx, quit in . Strong family history of hyperlipidemia. MGISAAC is the only family member with coronary artery disease. He  in his 60s. Diet includes meats. Avoids dairy and gluten. A bit overweight, BMI 27 at the last clinic visit last year. She has not formal exercise program, no hx of DM or hypertension. She is due for fasting lipid profile. 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     Joint pain  Shanna was evaluated by a rheumatologist last year for persistent swollen knuckle of her right index finger. All labs were normal. She was given a topical to use but has not needed it. No other joint pain at this time.     Hypothyroidism  Shanna was diagnosed with hypothyroidism about 8 yr ago. Currently on levothyroxine 112mcg daily. At baseline she has alternating constipation and diarrhea (IBS). She takes medication in the morning on an empty stomach, along with bupropion. Reports her job has been more stressful with COVID19 pandemic and she is feeling more tired but does not attribute this to her thyroid.     TSH   Date Value Ref Range Status   2019 2.56 0.40 - 4.00 mU/L Final     Depression  Shanna takes Bupropion for symptoms of depression. Medication was started 17 yr ago. No mental health related hospitalizations. No hx of CD or alcohol abuse. She reports job is exhausting, feeling burned out. She is interviewing for other positions. She still goes into work at the hospital. 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 Not suicidal, no panic attacks. She wishes to continue same dose of bupropion. PHQ9 and BORIS 7 inventories will be sent to her via my chart to complete.     No current counseling, she feels she is coping. Uses lorazepam when she takes flights.     PHQ 2017   PHQ-9 Total Score 4 3   Q9: Thoughts of better off dead/self-harm past 2 weeks Not at all Not at all     BORIS-7 SCORE 2016   Total Score 7 (mild anxiety) - -   Total Score - 7 3     Ob/gyn history   A3, has set of twin daughters, age 20  No menses x 8 months, then had recent period , then 3 wks later (this coincided with daughters moving back home)  Some hot flashes.  Spouse with Vasectomy     Social  , RN, works as clinic manager of [employer]  Twin daughters, age  20    HABITS:  Tob: former smoker, quit 2000, 4 pk yr  ETOH: 1 glass of wine daily  Calcium: coconut milk supplement Calcium 500mg once daily. Vit D 700 international unit(s) + D3 5000 IU  Caffeine: lots of coffee, 1/2 pot  Exercise: no formal program, has [place] bike    Cold sores  Has Rx for valacyclovir, reports she has not used it for more than a year    Health Maintenance  Mammogram: due this year  Colonoscopy: due in 2025  Last pap 2017, normal but no HPV done so due in 2020    Patient Active Problem List   Diagnosis     Allergic rhinitis due to pollen     Depressive disorder, not elsewhere classified     Other acne     ISOLATED OR SPECIFIC PHOBIAS NEC- flying      Hyperlipidemia LDL goal <130     Hypothyroidism     Constipation     Scoliosis     Low back pain     Menstrual irregularity       Current Outpatient Medications   Medication Sig Dispense Refill     B Complex Vitamins (B COMPLEX PO)        buPROPion (WELLBUTRIN XL) 300 MG 24 hr tablet TAKE 1 TABLET (300 MG) BY MOUTH EVERY MORNING 90 tablet 3     ibuprofen (ADVIL,MOTRIN) 600 MG tablet Take 1 tablet by mouth 3 times daily as needed.       levothyroxine (SYNTHROID/LEVOTHROID) 112 MCG tablet TAKE 1 TABLET (112 MCG) BY MOUTH DAILY 60 tablet 0     LORazepam (ATIVAN) 0.5 MG tablet Take 30 minutes prior to departure.  Do not operate a vehicle after taking this medication 10 tablet 0     Omega-3 Fatty Acids (FISH OIL PO)        simvastatin (ZOCOR) 10 MG tablet TAKE 1 TABLET (10 MG) BY MOUTH AT BEDTIME 90 tablet 3     valACYclovir (VALTREX) 1000 mg tablet Take 1 tablet (1,000 mg) by mouth 2 times daily Take 1 tablet (1,000 mg) by mouth 2 times daily x 3-5 days during outbreak (Patient not taking: Reported on 4/25/2019) 30 tablet 3       Allergies   Allergen Reactions     Nkda [No Known Drug Allergies]         EXAM  There were no vitals taken for this visit.  No exam performed    Assessment:  (Z00.00) Preventative health care  (primary encounter  diagnosis)  Comment: no exam today but she is due for mammogram and will be due for pap this year. Colonoscopy is up to date  Plan: CBC with platelets, Vitamin D Deficiency        Will check future labs at her convenience. Takes D supplement, 5700 international unit(s) daily.  Vaccines up to date    (F32.9) Minor depressive disorder  Comment: she reports mood is stable  Plan: await inventories. Will refill bupropion     (E78.5) Hyperlipidemia LDL goal <130  Comment: currently on Simvastatin, due for labs  Plan: Lipid Profile, Comprehensive metabolic panel        Await labs, then refill simvastatin    (E03.9) Acquired hypothyroidism  Comment: due for TSH, she has some fatigue, irritable bowel which is chronic  Plan: TSH with free T4 reflex        Will recheck levels in future lab draw    (Z12.31) Visit for screening mammogram  Comment: due for routine mammogram  Plan: Mammogram, routine screening        Referral is entered      Total time 34 min  8:03 AM start  8:37 AM  End    Daria Cadet MD  Internal Medicine/Pediatrics

## 2020-05-01 DIAGNOSIS — E03.9 ACQUIRED HYPOTHYROIDISM: ICD-10-CM

## 2020-05-01 RX ORDER — LEVOTHYROXINE SODIUM 112 UG/1
112 TABLET ORAL DAILY
Qty: 60 TABLET | Refills: 0 | Status: SHIPPED | OUTPATIENT
Start: 2020-05-01 | End: 2020-07-17

## 2020-05-01 NOTE — TELEPHONE ENCOUNTER
Last Office Visit: 4/21/20 - establish care   [place] [facility] [place] none  Medication last refilled: 3/5/20 #60    Patient due for TSH and due to COVID-19 routine labs have been postponed.     Delores Garvin RN  05/01/20  11:35 AM

## 2020-05-20 ENCOUNTER — ANCILLARY PROCEDURE (OUTPATIENT)
Dept: MAMMOGRAPHY | Facility: CLINIC | Age: 50
End: 2020-05-20
Attending: INTERNAL MEDICINE
Payer: COMMERCIAL

## 2020-05-20 DIAGNOSIS — Z12.31 VISIT FOR SCREENING MAMMOGRAM: ICD-10-CM

## 2020-05-22 DIAGNOSIS — F32.89 MINOR DEPRESSIVE DISORDER: ICD-10-CM

## 2020-05-26 RX ORDER — BUPROPION HYDROCHLORIDE 300 MG/1
300 TABLET ORAL EVERY MORNING
Qty: 90 TABLET | Refills: 0 | Status: SHIPPED | OUTPATIENT
Start: 2020-05-26 | End: 2020-08-28

## 2020-05-26 NOTE — TELEPHONE ENCOUNTER
Last visit, 4/21/20 virtual with Helio , no future appt noted     Medication is being filled for 1 time refill only due to:  Pt needs updated PHQ9 done.      Spoke with pt to remind her to do the PHQ9 and GAD7 inventories that Helio sent her from last virtual  visit.  She states she will do this.     COVID-19 , extra 90 day refill authorization per Med Director override.      Yoli Martinez RN  May 26, 2020 3:20 PM

## 2020-05-27 DIAGNOSIS — E78.5 HYPERLIPIDEMIA LDL GOAL <130: ICD-10-CM

## 2020-06-02 DIAGNOSIS — Z00.00 PREVENTATIVE HEALTH CARE: ICD-10-CM

## 2020-06-02 DIAGNOSIS — E03.9 ACQUIRED HYPOTHYROIDISM: ICD-10-CM

## 2020-06-02 DIAGNOSIS — E78.5 HYPERLIPIDEMIA LDL GOAL <130: ICD-10-CM

## 2020-06-02 LAB
ALBUMIN SERPL-MCNC: 3.8 G/DL (ref 3.4–5)
ALP SERPL-CCNC: 52 U/L (ref 40–150)
ALT SERPL W P-5'-P-CCNC: 23 U/L (ref 0–50)
ANION GAP SERPL CALCULATED.3IONS-SCNC: 5 MMOL/L (ref 3–14)
AST SERPL W P-5'-P-CCNC: 18 U/L (ref 0–45)
BILIRUB SERPL-MCNC: 0.5 MG/DL (ref 0.2–1.3)
BUN SERPL-MCNC: 9 MG/DL (ref 7–30)
CALCIUM SERPL-MCNC: 8.7 MG/DL (ref 8.5–10.1)
CHLORIDE SERPL-SCNC: 104 MMOL/L (ref 94–109)
CHOLEST SERPL-MCNC: 255 MG/DL
CO2 SERPL-SCNC: 29 MMOL/L (ref 20–32)
CREAT SERPL-MCNC: 0.83 MG/DL (ref 0.52–1.04)
DEPRECATED CALCIDIOL+CALCIFEROL SERPL-MC: 56 UG/L (ref 20–75)
ERYTHROCYTE [DISTWIDTH] IN BLOOD BY AUTOMATED COUNT: 12.6 % (ref 10–15)
GFR SERPL CREATININE-BSD FRML MDRD: 83 ML/MIN/{1.73_M2}
GLUCOSE SERPL-MCNC: 89 MG/DL (ref 70–99)
HCT VFR BLD AUTO: 38.2 % (ref 35–47)
HDLC SERPL-MCNC: 101 MG/DL
HGB BLD-MCNC: 13 G/DL (ref 11.7–15.7)
LDLC SERPL CALC-MCNC: 138 MG/DL
MCH RBC QN AUTO: 33 PG (ref 26.5–33)
MCHC RBC AUTO-ENTMCNC: 34 G/DL (ref 31.5–36.5)
MCV RBC AUTO: 97 FL (ref 78–100)
NONHDLC SERPL-MCNC: 154 MG/DL
PLATELET # BLD AUTO: 300 10E9/L (ref 150–450)
POTASSIUM SERPL-SCNC: 3.8 MMOL/L (ref 3.4–5.3)
PROT SERPL-MCNC: 7.2 G/DL (ref 6.8–8.8)
RBC # BLD AUTO: 3.94 10E12/L (ref 3.8–5.2)
SODIUM SERPL-SCNC: 138 MMOL/L (ref 133–144)
TRIGL SERPL-MCNC: 80 MG/DL
TSH SERPL DL<=0.005 MIU/L-ACNC: 3.03 MU/L (ref 0.4–4)
WBC # BLD AUTO: 5.2 10E9/L (ref 4–11)

## 2020-06-02 PROCEDURE — 80061 LIPID PANEL: CPT | Performed by: INTERNAL MEDICINE

## 2020-06-02 PROCEDURE — 36415 COLL VENOUS BLD VENIPUNCTURE: CPT | Performed by: INTERNAL MEDICINE

## 2020-06-02 PROCEDURE — 85027 COMPLETE CBC AUTOMATED: CPT | Performed by: INTERNAL MEDICINE

## 2020-06-02 PROCEDURE — 84443 ASSAY THYROID STIM HORMONE: CPT | Performed by: INTERNAL MEDICINE

## 2020-06-02 PROCEDURE — 82306 VITAMIN D 25 HYDROXY: CPT | Performed by: INTERNAL MEDICINE

## 2020-06-02 PROCEDURE — 80053 COMPREHEN METABOLIC PANEL: CPT | Performed by: INTERNAL MEDICINE

## 2020-06-02 NOTE — TELEPHONE ENCOUNTER
Last Office Visit: 4/21/20 - preventative health exam  [place] [facility] [place] none  Medication last refilled: 5/30/19  Last labs: 6/2/20 - results note - change dosing to simvastatin 20 mg    Routing refill request to provider for review/approval because: New dosing teed up, per lab results    Delores Garvin RN  06/03/20  10:00 AM

## 2020-06-04 DIAGNOSIS — E78.5 HYPERLIPIDEMIA LDL GOAL <130: Primary | ICD-10-CM

## 2020-06-04 RX ORDER — SIMVASTATIN 20 MG
20 TABLET ORAL AT BEDTIME
Qty: 90 TABLET | Refills: 0 | Status: SHIPPED | OUTPATIENT
Start: 2020-06-04 | End: 2020-09-02

## 2020-07-15 DIAGNOSIS — E03.9 ACQUIRED HYPOTHYROIDISM: ICD-10-CM

## 2020-07-15 NOTE — TELEPHONE ENCOUNTER
Last time prescribed: 05/01/2020 , 60 tabs x 0 refills  Last office visit: 04/21/2020  Next appointment: No future appointments      Prescription approved per [facility] Refill Protocol.        Yoli Martinez RN  July 17, 2020 2:31 PM

## 2020-07-17 RX ORDER — LEVOTHYROXINE SODIUM 112 UG/1
112 TABLET ORAL DAILY
Qty: 90 TABLET | Refills: 3 | Status: SHIPPED | OUTPATIENT
Start: 2020-07-17 | End: 2021-06-10

## 2020-08-28 DIAGNOSIS — F32.89 MINOR DEPRESSIVE DISORDER: ICD-10-CM

## 2020-08-28 RX ORDER — BUPROPION HYDROCHLORIDE 300 MG/1
300 TABLET ORAL EVERY MORNING
Qty: 90 TABLET | Refills: 1 | Status: SHIPPED | OUTPATIENT
Start: 2020-08-28 | End: 2021-03-09

## 2020-08-28 NOTE — TELEPHONE ENCOUNTER
Last time prescribed: 5/26/2020 , 90 tabs x 0 refills  Last office visit: 4/21/2020  Next appointment: No future appointments    Prescription approved per [facility] Refill Protocol.    Yoli Martinez RN  August 28, 2020 3:38 PM

## 2020-08-31 DIAGNOSIS — E78.5 HYPERLIPIDEMIA LDL GOAL <130: ICD-10-CM

## 2020-08-31 NOTE — TELEPHONE ENCOUNTER
Last time prescribed: 6/4/2020 , 90 tabs x 0 refills  Last office visit: 4/21/2020  Next appointment: No future appointments    Needs repeat Lipid Panel - lab visit.  to reach patient for scheduling.     Medication is being filled for 1 time refill only due to:  Patient needs labs repeat lipid panel, medication was adjusted 3 months ago and need labs .  Delores Garvin RN  09/02/20  4:38 PM

## 2020-09-02 RX ORDER — SIMVASTATIN 20 MG
20 TABLET ORAL AT BEDTIME
Qty: 30 TABLET | Refills: 0 | Status: SHIPPED | OUTPATIENT
Start: 2020-09-02 | End: 2020-10-23

## 2020-09-16 DIAGNOSIS — E78.5 HYPERLIPIDEMIA LDL GOAL <130: ICD-10-CM

## 2020-09-16 LAB
CHOLEST SERPL-MCNC: 250 MG/DL (ref 0–200)
CHOLEST/HDLC SERPL: 2.8 {RATIO} (ref 0–5)
FASTING SPECIMEN: YES
HDLC SERPL-MCNC: 90 MG/DL
LDLC SERPL CALC-MCNC: 147 MG/DL (ref 0–129)
TRIGL SERPL-MCNC: 64 MG/DL (ref 0–150)
VLDL-CHOLESTEROL: 13 (ref 7–32)

## 2020-09-17 DIAGNOSIS — E78.5 HYPERLIPIDEMIA WITH TARGET LDL LESS THAN 130: Primary | ICD-10-CM

## 2020-10-22 DIAGNOSIS — E78.5 HYPERLIPIDEMIA LDL GOAL <130: ICD-10-CM

## 2020-10-22 NOTE — TELEPHONE ENCOUNTER
Last time prescribed: 9/2/20 , 30 tabs/caps x 0 refills  Last office visit: 4/21/20  Next appointment: No Future Appointment Scheduled    Prescription approved per [facility] Refill Protocol.  Spoke with pt to clarify what she is taking, ? Weaning, diet only  Messages with MD and pt confusing.     Pt confirmed, she is trying diet and the 20 mg dose together  For now, and will need lipid panel again in about 3 months  Future already placed         Yoli Martinez RN  October 23, 2020 3:45 PM

## 2020-10-23 RX ORDER — SIMVASTATIN 20 MG
20 TABLET ORAL AT BEDTIME
Qty: 90 TABLET | Refills: 0 | Status: SHIPPED | OUTPATIENT
Start: 2020-10-23 | End: 2021-01-15

## 2021-01-15 ENCOUNTER — HEALTH MAINTENANCE LETTER (OUTPATIENT)
Age: 51
End: 2021-01-15

## 2021-01-15 DIAGNOSIS — E78.5 HYPERLIPIDEMIA WITH TARGET LDL LESS THAN 130: Primary | ICD-10-CM

## 2021-01-15 DIAGNOSIS — E78.5 HYPERLIPIDEMIA LDL GOAL <130: ICD-10-CM

## 2021-01-15 RX ORDER — SIMVASTATIN 20 MG
TABLET ORAL
Qty: 30 TABLET | Refills: 0 | Status: SHIPPED | OUTPATIENT
Start: 2021-01-15 | End: 2021-03-05

## 2021-01-15 NOTE — TELEPHONE ENCOUNTER
Last time prescribed: 10/23/2020 , 90 tabs x 0 refills  Last office visit: 4/21/2020 - virtual visit   Next appointment: No future appointments    Last lipid panel 9/16/20 - another future order was placed due to abnormal labs.     Routing refill request to provider for review/approval because:  Unsure if she is to come back for lipid lab, or if she needs appointment in clinic - please advise.     Delores Garvin RN  01/15/21  3:01 PM

## 2021-02-05 NOTE — TELEPHONE ENCOUNTER
DIAGNOSIS: Right arm/ Elbow pain/ self / no imaging   APPOINTMENT DATE: 2.10.21   NOTES STATUS DETAILS   OFFICE NOTE from referring provider N/A    OFFICE NOTE from other specialist N/A    DISCHARGE SUMMARY from hospital N/A    DISCHARGE REPORT from the ER N/A    OPERATIVE REPORT N/A    EMG report N/A    MEDICATION LIST Internal    MRI N/A    DEXA (osteoporosis/bone health) N/A    CT SCAN N/A    XRAYS (IMAGES & REPORTS) N/A

## 2021-02-10 ENCOUNTER — PRE VISIT (OUTPATIENT)
Dept: ORTHOPEDICS | Facility: CLINIC | Age: 51
End: 2021-02-10

## 2021-02-10 ENCOUNTER — OFFICE VISIT (OUTPATIENT)
Dept: ORTHOPEDICS | Facility: CLINIC | Age: 51
End: 2021-02-10
Payer: COMMERCIAL

## 2021-02-10 VITALS
SYSTOLIC BLOOD PRESSURE: 119 MMHG | HEART RATE: 78 BPM | WEIGHT: 185.1 LBS | DIASTOLIC BLOOD PRESSURE: 70 MMHG | BODY MASS INDEX: 29.05 KG/M2 | HEIGHT: 67 IN

## 2021-02-10 DIAGNOSIS — M77.11 LATERAL EPICONDYLITIS OF RIGHT ELBOW: Primary | ICD-10-CM

## 2021-02-10 PROCEDURE — 99202 OFFICE O/P NEW SF 15 MIN: CPT | Performed by: FAMILY MEDICINE

## 2021-02-10 ASSESSMENT — MIFFLIN-ST. JEOR: SCORE: 1502.36

## 2021-02-10 NOTE — LETTER
2/10/2021      RE: Shanna Kang  [address] [facility] [address]        Subjective:   Shanna Kang is a 50 year old RHD female who presents with right posterior and lateral elbow pain. She reports that pain radiates down lateral forearm. Denies any numbness and tingling.   Carpal tunnel, computer and cell phone.  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  4 weeks ago, really hurt.  Lateral elbow, aches and been to Rheumatology.  Didn't have anything on labs, some OA.  Can't vacuum well, it hurts.  Pain, can do things.  Doesn't want to limit recovery,  Ice helps.  No OT, does some stretching. No strengthening.  Virtual, will have some bags but will likely get a backpack.  No golf or tennis activities    Background:   Date of injury: None  Duration of symptoms: 4 weeks persistent; years waxing and waning   Mechanism of Injury: Insidious Onset; Unknown   Intensity: 0/10 at rest, 4/10 with activity   Aggravating factors: Supination/pronation, anytime she uses right arm, peloton use, cellphone use  Relieving Factors: ice, NSAIDs, acetaminophen, and Inflamma-blox  Prior Evaluation: None    PAST MEDICAL, SOCIAL, SURGICAL AND FAMILY HISTORY: She  has a past medical history of Constipation, Depressive disorder, not elsewhere classified, Hypothyroid, IBS (irritable bowel syndrome), Infectious mononucleosis (), Other kyphoscoliosis and scoliosis, and Pure hypercholesterolemia.  She  has a past surgical history that includes  DELIVERY ONLY; SPINE FUSION,POSTER,13+ SGMTS; OPEN TREATMENT CLAVICULAR FRACTURE INTERNAL FX; biopsy of skin lesion; and colonoscopy (2015).  Her family history includes Aortic aneurysm (age of onset: 65) in her father; Heart Disease in her maternal grandfather; Hypothyroidism in her mother; Leukemia in her paternal aunt; Lipids in her maternal aunt, maternal grandfather, maternal grandmother, maternal uncle, and mother; Lupus in  "her paternal aunt.  She reports that she quit smoking about 21 years ago. She has a 4.00 pack-year smoking history. She has never used smokeless tobacco. She reports current alcohol use of about 1.0 standard drinks of alcohol per week. She reports that she does not use drugs.    ALLERGIES: She is allergic to nkda [no known drug allergies].    CURRENT MEDICATIONS: She has a current medication list which includes the following prescription(s): b complex vitamins, bupropion, ibuprofen, levothyroxine, lorazepam, omega-3 fatty acids, and simvastatin.     REVIEW OF SYSTEMS: 10 point review of systems is negative except as noted above.     Exam:   /70 (BP Location: Left arm, Patient Position: Sitting, Cuff Size: Adult Regular)   Pulse 78   Ht 1.71 m (5' 7.32\")   Wt 84 kg (185 lb 1.6 oz)   BMI 28.71 kg/m             CONSTITUTIONAL: alert, mild distress, cooperative and over weight  HEAD: Normocephalic. No masses, lesions, tenderness or abnormalities  SKIN: no suspicious lesions or rashes  GAIT: normal  NEUROLOGIC: Non-focal  PSYCHIATRIC: affect normal/bright and mentation appears normal.    MUSCULOSKELETAL: right lateral elbow pain  Inspection: no swelling, no ecchymosis, no olecranon bursa swelling, no distal bicep tendon defect  Tender: lateral epicondyle, common extensor tendon and extensor muscle of forearm  Non-tender: medial epicondyle, flexor muscle of forearm, distal bicep tendon, radial head/neck and shoulder exam is benign  Range of Motion: all normal  Strength: elbow strength full  Special tests: normal valgus stress, normal varus stress, pain with resisted wrist extension:     Right arm: pain at elbow, not shoulder, RC intact    Palpation:  Non-tender SC joint, clavicle, AC joint, acromion, subacromial space, proximal bicep tendon and upper trapezius muscle   Range of Motion        Active:all normal        Passive: all normal  Strength: rotator cuff strength full  Special tests: Negative Neer's test, " Negative Avendano, Negative Cross-Arm adduction, Negative Hines's       Assessment/Plan:   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  Right lateral epicondylosis-  Patient reports worsening extensor pain with gripping and lifting  ReCommended hand therapy for formal strengthening program  We discussed the possibility of prolotherapy injection if not improving  Ice, tennis elbow brace, Tylenol or ibuprofen as needed    RTC prn, 4 weeks    14 min visit and 5 min charting    X-RAY INTERPRETATION:   none      Rosa Us MD

## 2021-02-10 NOTE — PROGRESS NOTES
Subjective:   Shanna Kang is a 50 year old RHD female who presents with right posterior and lateral elbow pain. She reports that pain radiates down lateral forearm. Denies any numbness and tingling.   Carpal tunnel, computer and cell phone.  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  4 weeks ago, really hurt.  Lateral elbow, aches and been to Rheumatology.  Didn't have anything on labs, some OA.  Can't vacuum well, it hurts.  Pain, can do things.  Doesn't want to limit recovery,  Ice helps.  No OT, does some stretching. No strengthening.  Virtual, will have some bags but will likely get a backpack.  No golf or tennis activities    Background:   Date of injury: None  Duration of symptoms: 4 weeks persistent; years waxing and waning   Mechanism of Injury: Insidious Onset; Unknown   Intensity: 0/10 at rest, 4/10 with activity   Aggravating factors: Supination/pronation, anytime she uses right arm, peloton use, cellphone use  Relieving Factors: ice, NSAIDs, acetaminophen, and Inflamma-blox  Prior Evaluation: None    PAST MEDICAL, SOCIAL, SURGICAL AND FAMILY HISTORY: She  has a past medical history of Constipation, Depressive disorder, not elsewhere classified, Hypothyroid, IBS (irritable bowel syndrome), Infectious mononucleosis (), Other kyphoscoliosis and scoliosis, and Pure hypercholesterolemia.  She  has a past surgical history that includes  DELIVERY ONLY; SPINE FUSION,POSTER,13+ SGMTS; OPEN TREATMENT CLAVICULAR FRACTURE INTERNAL FX; biopsy of skin lesion; and colonoscopy (2015).  Her family history includes Aortic aneurysm (age of onset: 65) in her father; Heart Disease in her maternal grandfather; Hypothyroidism in her mother; Leukemia in her paternal aunt; Lipids in her maternal aunt, maternal grandfather, maternal grandmother, maternal uncle, and mother; Lupus in her paternal aunt.  She reports that she quit smoking about 21 years ago. She has a 4.00  "pack-year smoking history. She has never used smokeless tobacco. She reports current alcohol use of about 1.0 standard drinks of alcohol per week. She reports that she does not use drugs.    ALLERGIES: She is allergic to nkda [no known drug allergies].    CURRENT MEDICATIONS: She has a current medication list which includes the following prescription(s): b complex vitamins, bupropion, ibuprofen, levothyroxine, lorazepam, omega-3 fatty acids, and simvastatin.     REVIEW OF SYSTEMS: 10 point review of systems is negative except as noted above.     Exam:   /70 (BP Location: Left arm, Patient Position: Sitting, Cuff Size: Adult Regular)   Pulse 78   Ht 1.71 m (5' 7.32\")   Wt 84 kg (185 lb 1.6 oz)   BMI 28.71 kg/m             CONSTITUTIONAL: alert, mild distress, cooperative and over weight  HEAD: Normocephalic. No masses, lesions, tenderness or abnormalities  SKIN: no suspicious lesions or rashes  GAIT: normal  NEUROLOGIC: Non-focal  PSYCHIATRIC: affect normal/bright and mentation appears normal.    MUSCULOSKELETAL: right lateral elbow pain  Inspection: no swelling, no ecchymosis, no olecranon bursa swelling, no distal bicep tendon defect  Tender: lateral epicondyle, common extensor tendon and extensor muscle of forearm  Non-tender: medial epicondyle, flexor muscle of forearm, distal bicep tendon, radial head/neck and shoulder exam is benign  Range of Motion: all normal  Strength: elbow strength full  Special tests: normal valgus stress, normal varus stress, pain with resisted wrist extension:     Right arm: pain at elbow, not shoulder, RC intact    Palpation:  Non-tender SC joint, clavicle, AC joint, acromion, subacromial space, proximal bicep tendon and upper trapezius muscle   Range of Motion        Active:all normal        Passive: all normal  Strength: rotator cuff strength full  Special tests: Negative Neer's test, Negative Avendano, Negative Cross-Arm adduction, Negative Hines's       Assessment/Plan: "   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  Right lateral epicondylosis-  Patient reports worsening extensor pain with gripping and lifting  ReCommended hand therapy for formal strengthening program  We discussed the possibility of prolotherapy injection if not improving  Ice, tennis elbow brace, Tylenol or ibuprofen as needed    RTC prn, 4 weeks    14 min visit and 5 min charting    X-RAY INTERPRETATION:   none

## 2021-02-12 ENCOUNTER — THERAPY VISIT (OUTPATIENT)
Dept: OCCUPATIONAL THERAPY | Facility: CLINIC | Age: 51
End: 2021-02-12
Payer: COMMERCIAL

## 2021-02-12 DIAGNOSIS — M77.11 LATERAL EPICONDYLITIS OF RIGHT ELBOW: ICD-10-CM

## 2021-02-12 DIAGNOSIS — M79.631 PAIN OF RIGHT FOREARM: Primary | ICD-10-CM

## 2021-02-12 PROCEDURE — 97140 MANUAL THERAPY 1/> REGIONS: CPT | Mod: GO | Performed by: OCCUPATIONAL THERAPIST

## 2021-02-12 PROCEDURE — 97112 NEUROMUSCULAR REEDUCATION: CPT | Mod: GO | Performed by: OCCUPATIONAL THERAPIST

## 2021-02-12 PROCEDURE — 97165 OT EVAL LOW COMPLEX 30 MIN: CPT | Mod: GO | Performed by: OCCUPATIONAL THERAPIST

## 2021-02-12 PROCEDURE — 97110 THERAPEUTIC EXERCISES: CPT | Mod: GO | Performed by: OCCUPATIONAL THERAPIST

## 2021-02-12 NOTE — PROGRESS NOTES
Hand Therapy Initial Evaluation    Current Date:  2021    Diagnosis:  Right  forearm pain  DOI:  21    Referring MD: Rosa Us MD    Next MD visit: PRN      Initial Subjective:  Shanna Kang is a 50 year old  right  hand dominant female.    Patient reports symptoms of pain and burning of the right forearm which occurred due to overuse. Since onset symptoms are Unchanged  Special tests:  none.  Previous treatment: none.    General health as reported by patient is excellent.  Pertinent medical history includes:  Past Medical History:   Diagnosis Date     Constipation      Depressive disorder, not elsewhere classified     was on prozac in past, now on Wellbutrin     Hypothyroid      IBS (irritable bowel syndrome)     Better off gluten and dairy     Infectious mononucleosis      Other kyphoscoliosis and scoliosis     see surgical history - multiple surgeries     Pure hypercholesterolemia       Medical allergies:   Allergies   Allergen Reactions     Nkda [No Known Drug Allergies]      Surgical history:   Past Surgical History:   Procedure Laterality Date     BIOPSY OF SKIN LESION       C  DELIVERY ONLY      , Low Cervical     C SPINE FUSION,POSTER,13+ SGMTS      fusions x2 and removal of CD rods x 2 and one for infection , T3-L4/5     COLONOSCOPY  2015    repeat in 10 yr     [facility] OPEN TREATMENT CLAVICULAR FRACTURE INTERNAL FX      after non-union- left, hardware placed then removed     Medication history:   Current Outpatient Medications   Medication     B Complex Vitamins (B COMPLEX PO)     buPROPion (WELLBUTRIN XL) 300 MG 24 hr tablet     ibuprofen (ADVIL,MOTRIN) 600 MG tablet     levothyroxine (SYNTHROID/LEVOTHROID) 112 MCG tablet     LORazepam (ATIVAN) 0.5 MG tablet     Omega-3 Fatty Acids (FISH OIL PO)     simvastatin (ZOCOR) 20 MG tablet     No current facility-administered medications for this visit.        Occupational Profile Information:  Current occupation is  nurse educator  Currently working in normal job without restrictions  Job Tasks: Computer Work, Prolonged Sitting, Repetitive Tasks  Prior functional level:  no limitations  Barriers include:none  Mobility: No difficulty  Transportation: drives  Leisure activities/hobbies: walking outside, rides [place] (can't as much now), travel  Additional Occupational Profile Information (patterns of daily living, interests, values and needs):Pt reports difficulty with bathing, dressing, eating/feeding self, hygiene, household chores, work , sports/recreation, pushing, pulling, lifting, carrying and reaching  GOAL: kitchen, computer clean the house, couldn't vacuum    Functional Outcome Measure:  See flowsheet      Objective:  Observation: Color/Temperature:     [x]    Normal  []    Abnormal    Pain Level (Scale 0-10):   2/12/2021   At Rest 0   With Use 6     Pain Description:  Date 2/12/2021    right   Location  forearm   Pain Quality Aching, Burning, Dull and radiating up the arm   Frequency intermittent or daily     Pain is worst  daytime   Exacerbated by  use of R arm, computer and vacuuming   Relieved by NSAIDs, otc medications, rest and OTC tumeric blend   Progression Unchanged        ROM:    Wrist 2/12/2021   AROM(PROM) Right   Extension WNL+     Flexion WNL + something different   RD    UD    Supination WNL   Pronation WNL     Special Tests:  Wrist & Elbow   + indicates mild pain, ++ indicates moderate pain, +++ indicates severe pain   2/12/2021   +/- for pain: grade of pain 0-10/10 Right   Tinels at Carpal Tunnel -   Tinels at flexor/pronator junction -   Tinels at cubital tunnel -   Tinels at [place] -   Tinels at DRSN radial wrist +   Median nerve compression at wrist Carpal Tunnel -   ELBOW    Palpation at LEP/MEP + LEP   Resisted ECRB wrist, w/elbow flexed +   Resisted ECRB with elbow extended -   Wrist flexors, resisted -   Resisted Long finger (PIN test)EDC resisted +   Resisted Supination -   Pronator  resisted -   Palpation at PIN site +   -at  BR (RN) at elbow +   Humeral groove and Posterior Quadrangular space +     Upper Limb Neurodynamic Test: Grade 0-5/5 (+/-)    Normal is 5/5 at full extent of neural tissue [Test is to first symptom of tension -S1; S2 facial grimace] right left   Position: seated 2/12/2021 2/12/2021   Radial Neurodynamic Test  4 5       Strength   (Measured in pounds)  Pain-free /Pinch Test    Pain Report:  + mild    ++ moderate    +++ severe    2/12/2021 2/12/2021   Trials Right Left   1  2  3 53+ 65  64  56   Average 53 62     Lat Pinch 2/12/2021 2/12/2021   Trials Right Left   1  2  3 18  18  17 18  17  17   Average 18 17     3 Pt Pinch 2/12/2021 2/12/2021   Trials Right Left   1  2  3 15  14  14 16  15  14   Average 14 15       Edema:  none     Sensation: Decreased Dorsal Radial Sensory Nerve distribution per pt report         Assessment:   Patient presents with symptoms consistent with above diagnosis,  with conservative intervention. Special testing during evaluation indicating pain at the Radial nerve in the forearm with pain at the origin of wrist extensors.    Patient's limitations or Problem List includes:  Pain, Weakness, Sensory disturbance and Decreased  of the right elbow and forearm which interferes with the patient's ability to perform Self Care Tasks (dressing, bathing), Work Tasks, Recreational Activities and Household Chores as compared to previous level of function.    Rehab Potential:  Excellent - Return to full activity, no limitations    Patient will benefit from skilled Occupational Therapy to increase flexibility, overall strength,  strength, forearm strength and sensation and decrease pain to return to previous activity level and resume normal daily tasks and to reach their rehab potential.    Barriers to Learning:  No barrier    Communication Issues:  Patient appears to be able to clearly communicate and understand verbal and written communication  and follow directions correctly.  Chart Review: Chart Review, Brief history including review of medical and/or therapy records relating to the presenting problem and Simple history review with patient    Identified Performance Deficits: bathing/showering, hygiene and grooming, driving and community mobility, home establishment and management, meal preparation and cleanup, work and play    Assessment of Occupational Performance:  3-5 Performance Deficits    Clinical Decision Making (Complexity): Low complexity    Treatment Explanation:  The following has been discussed with the patient:  RX ordered/plan of care  Anticipated outcomes  Possible risks and side effects    Treatment Plan:   Frequency:  1 X week, once daily  Duration:  for 8 weeks     Therapeutic Exercise:  AROM, PROM, Isotonics and Isometrics  Neuromuscular re-education:  Nerve Gliding  Manual Techniques:  Myofascial release  Self Care:  Ergonomic Considerations      Discharge Plan:  Achieve all LTG  [place] in home treatment program.  Reach maximal therapeutic benefit.  Please refer to the daily flowsheet for treatment today and total treatment time.      Home Exercise Program:  Exercise Name: TENNIS ELBOW PREVENTION, Notes: Use the  ergonomics of lifting underhanded, using another  or lift if any pain and use cold to reduce sx. , EMR Notes: Edu pt on ergonomics of lifting underhanded, using another  or lift if any pain and use of cold to reduce sx.   Exercise Name: Nerve Gliding Proximal Radial, Sets: 3 - Reps: 3 - Sessions: 3, Notes: Right Arm: Stand with heels together for good posture. One arm at a time; Waiters Tip.  Tilt head toward each side, as you are gliding the arm/nerve, DO NOT TILT HEAD AWAY, EMR Notes: 3 set of 3, 3x/day: Right Arm: Stand with heels together for good posture. One arm at a time; Waiters Tip.  Tilt head toward each side, as you are gliding the arm/nerve, DO NOT TILT HEAD AWAY  Exercise Name: Forearm PROM Advanced  "Flexor Stretch, Sets: 1 set - Reps: 3 reps - Sessions: 2-3x/day;, Notes: KEEP YOUR SHOULDER RELAXED AND DOWN. Start with elbow bent   I recommend the \"power\" of 3 repetitions for stretching.By the 3rd stretch the muscles will feel more loose.   Hold each stretch for 15-30 seconds each. , EMR Notes: KEEP YOUR SHOULDER RELAXED AND DOWN. Start with elbow bent   I recommend the \"power\" of 3 repetitions for stretching.By the 3rd stretch the muscles will feel more loose.   Hold each stretch for 15-30 seconds each.   Exercise Name: Forearm Passive Range of Motion Extensor Stretch, Sets: 1 set - Reps: 3 reps, Notes: TILT YOUR HEAD TO YOUR RIGHT SIDE Start with your elbow at your side, then gradually increase straightening of the elbow, minding the pain, don't push into the pain. I recommend the \"power\" of 3 repetitions for stretching.By the 3rd stretch the muscles will feel more loose.   At the end of the stretch, ANGLE your hand toward your same hip. Hold each stretch for 15-30 seconds each.    , EMR Notes: TILT YOUR HEAD TO YOUR RIGHT SIDE Start with your elbow at your side, then gradually increase straightening of the elbow, minding the pain, don't push into the pain. I recommend the \"power\" of 3 repetitions for stretching.By the 3rd stretch the muscles will feel more loose.   At the end of the stretch, ANGLE your hand toward your same hip. Hold each stretch for 15-30 seconds each.      Exercise Name: Ball Massage, Notes: Also consider a type of Theraflex flexbar for forearm \"foam rolling\", EMR Notes: Also consider a type of Theraflex flexbar for forearm \"foam rolling\"  Exercise Name: Ball Massage to Extensors, Notes: Wrap shelf liner on the ball, secure with rubber bands. Use doggy (small) or a tennis ball over muscles, or ask for assistance from \"loved one\"  to do this. Use double balls in a sock Wrap shelf liner on the ball, secure with rubber bands.  Find the tender spot and hold and do small circles over that spot " "until it is less tender., EMR Notes: Wrap shelf liner on the ball, secure with rubber bands. Use doggy (small) or a tennis ball over muscles, or ask for assistance from \"loved one\"  to do this. Use double balls in a sock Wrap shelf liner on the ball, secure with rubber bands.  Find the tender spot and hold and do small circles over that spot until it is less tender.  Exercise Name: Ball Massage to Flexors - Sessions: 1-2 x / day, Notes: Use double balls in a sock Wrap shelf liner on the ball, secure with rubber bands. Run the ball Stay in the muscle belly region. Use a hard, rubber  ball to massage the muscles, or ask for assistance from \"loved one\", EMR Notes: Use double balls in a sock Wrap shelf liner on the ball, secure with rubber bands. Run the ball Stay in the muscle belly region. Use a hard, rubber  ball to massage the muscles, or ask for assistance from \"loved one\"    Next Visit:  Progress to strengthening as tolerated as pain subsides  Review computer ergonomics      "

## 2021-02-18 ENCOUNTER — THERAPY VISIT (OUTPATIENT)
Dept: OCCUPATIONAL THERAPY | Facility: CLINIC | Age: 51
End: 2021-02-18
Payer: COMMERCIAL

## 2021-02-18 DIAGNOSIS — M79.631 PAIN OF RIGHT FOREARM: Primary | ICD-10-CM

## 2021-02-18 PROCEDURE — 97112 NEUROMUSCULAR REEDUCATION: CPT | Mod: GO | Performed by: OCCUPATIONAL THERAPIST

## 2021-02-18 PROCEDURE — 97140 MANUAL THERAPY 1/> REGIONS: CPT | Mod: GO | Performed by: OCCUPATIONAL THERAPIST

## 2021-02-25 ENCOUNTER — THERAPY VISIT (OUTPATIENT)
Dept: OCCUPATIONAL THERAPY | Facility: CLINIC | Age: 51
End: 2021-02-25
Payer: COMMERCIAL

## 2021-02-25 DIAGNOSIS — M79.631 PAIN OF RIGHT FOREARM: ICD-10-CM

## 2021-02-25 PROCEDURE — 97140 MANUAL THERAPY 1/> REGIONS: CPT | Mod: GO | Performed by: OCCUPATIONAL THERAPIST

## 2021-02-25 PROCEDURE — 97112 NEUROMUSCULAR REEDUCATION: CPT | Mod: GO | Performed by: OCCUPATIONAL THERAPIST

## 2021-02-25 PROCEDURE — 97110 THERAPEUTIC EXERCISES: CPT | Mod: GO | Performed by: OCCUPATIONAL THERAPIST

## 2021-02-27 ENCOUNTER — IMMUNIZATION (OUTPATIENT)
Dept: NURSING | Facility: CLINIC | Age: 51
End: 2021-02-27
Payer: COMMERCIAL

## 2021-02-27 PROCEDURE — 0011A PR COVID VAC MODERNA 100 MCG/0.5 ML IM: CPT

## 2021-02-27 PROCEDURE — 91301 PR COVID VAC MODERNA 100 MCG/0.5 ML IM: CPT

## 2021-03-01 DIAGNOSIS — E78.5 HYPERLIPIDEMIA LDL GOAL <130: ICD-10-CM

## 2021-03-01 NOTE — TELEPHONE ENCOUNTER
Last time prescribed: 1/15/21 , 30 tabs/caps x 0 refills  Last office visit: 4/21/20    Patient scheduled virtual visit to discuss lipid labs on 3/9/21.    Routing refill request to provider for review/approval because:  Looks like maybe pt not taking medication daily, as last prescribed close to 2 months ago for #30. Do you want to wait on reordering this refill until her appointment next week?    Delores Garvin RN  03/02/21  4:10 PM

## 2021-03-02 DIAGNOSIS — E78.5 HYPERLIPIDEMIA WITH TARGET LDL LESS THAN 130: ICD-10-CM

## 2021-03-02 LAB
CHOLEST SERPL-MCNC: 251 MG/DL
HDLC SERPL-MCNC: 98 MG/DL
LDLC SERPL CALC-MCNC: 141 MG/DL
NONHDLC SERPL-MCNC: 154 MG/DL
TRIGL SERPL-MCNC: 65 MG/DL

## 2021-03-02 PROCEDURE — 36415 COLL VENOUS BLD VENIPUNCTURE: CPT | Performed by: PATHOLOGY

## 2021-03-02 PROCEDURE — 80061 LIPID PANEL: CPT | Performed by: PATHOLOGY

## 2021-03-04 DIAGNOSIS — F32.89 MINOR DEPRESSIVE DISORDER: ICD-10-CM

## 2021-03-04 NOTE — TELEPHONE ENCOUNTER
Last time prescribed: 8/28/20 , 90 tabs/caps x 1 refills  Last office visit: 4/21/20  Next appointment: 3/9/21    Pt has enough of this Wellbutrin to make it to her Appt next Tuesday.  Will not refill today, denied.  Pt will bring up refill need at appt.     Yoli Martinez RN  March 5, 2021 1:59 PM

## 2021-03-05 RX ORDER — SIMVASTATIN 20 MG
TABLET ORAL
Qty: 30 TABLET | Refills: 0 | Status: SHIPPED | OUTPATIENT
Start: 2021-03-05 | End: 2021-03-16 | Stop reason: DRUGHIGH

## 2021-03-05 RX ORDER — BUPROPION HYDROCHLORIDE 300 MG/1
300 TABLET ORAL EVERY MORNING
Qty: 90 TABLET | Refills: 1 | OUTPATIENT
Start: 2021-03-05

## 2021-03-09 ENCOUNTER — OFFICE VISIT (OUTPATIENT)
Dept: FAMILY MEDICINE | Facility: CLINIC | Age: 51
End: 2021-03-09
Payer: COMMERCIAL

## 2021-03-09 VITALS
WEIGHT: 188 LBS | BODY MASS INDEX: 29.51 KG/M2 | OXYGEN SATURATION: 96 % | TEMPERATURE: 97.2 F | HEART RATE: 69 BPM | RESPIRATION RATE: 13 BRPM | DIASTOLIC BLOOD PRESSURE: 71 MMHG | HEIGHT: 67 IN | SYSTOLIC BLOOD PRESSURE: 115 MMHG

## 2021-03-09 DIAGNOSIS — Z12.31 VISIT FOR SCREENING MAMMOGRAM: ICD-10-CM

## 2021-03-09 DIAGNOSIS — E03.9 HYPOTHYROIDISM, UNSPECIFIED TYPE: ICD-10-CM

## 2021-03-09 DIAGNOSIS — E78.5 HYPERLIPIDEMIA LDL GOAL <130: Primary | ICD-10-CM

## 2021-03-09 DIAGNOSIS — F32.89 MINOR DEPRESSIVE DISORDER: ICD-10-CM

## 2021-03-09 RX ORDER — ROSUVASTATIN CALCIUM 20 MG/1
20 TABLET, COATED ORAL DAILY
Qty: 30 TABLET | Refills: 1 | Status: SHIPPED | OUTPATIENT
Start: 2021-03-09 | End: 2021-04-19

## 2021-03-09 RX ORDER — BUPROPION HYDROCHLORIDE 300 MG/1
300 TABLET ORAL EVERY MORNING
Qty: 90 TABLET | Refills: 3 | Status: SHIPPED | OUTPATIENT
Start: 2021-03-09 | End: 2022-03-21

## 2021-03-09 ASSESSMENT — ANXIETY QUESTIONNAIRES
3. WORRYING TOO MUCH ABOUT DIFFERENT THINGS: NOT AT ALL
1. FEELING NERVOUS, ANXIOUS, OR ON EDGE: NOT AT ALL
5. BEING SO RESTLESS THAT IT IS HARD TO SIT STILL: NOT AT ALL
7. FEELING AFRAID AS IF SOMETHING AWFUL MIGHT HAPPEN: NOT AT ALL
3. WORRYING TOO MUCH ABOUT DIFFERENT THINGS: NOT AT ALL
2. NOT BEING ABLE TO STOP OR CONTROL WORRYING: NOT AT ALL
2. NOT BEING ABLE TO STOP OR CONTROL WORRYING: NOT AT ALL
6. BECOMING EASILY ANNOYED OR IRRITABLE: NOT AT ALL
GAD7 TOTAL SCORE: 0
IF YOU CHECKED OFF ANY PROBLEMS ON THIS QUESTIONNAIRE, HOW DIFFICULT HAVE THESE PROBLEMS MADE IT FOR YOU TO DO YOUR WORK, TAKE CARE OF THINGS AT HOME, OR GET ALONG WITH OTHER PEOPLE: NOT DIFFICULT AT ALL
1. FEELING NERVOUS, ANXIOUS, OR ON EDGE: NOT AT ALL
GAD7 TOTAL SCORE: 0
7. FEELING AFRAID AS IF SOMETHING AWFUL MIGHT HAPPEN: NOT AT ALL
IF YOU CHECKED OFF ANY PROBLEMS ON THIS QUESTIONNAIRE, HOW DIFFICULT HAVE THESE PROBLEMS MADE IT FOR YOU TO DO YOUR WORK, TAKE CARE OF THINGS AT HOME, OR GET ALONG WITH OTHER PEOPLE: NOT DIFFICULT AT ALL
6. BECOMING EASILY ANNOYED OR IRRITABLE: NOT AT ALL
5. BEING SO RESTLESS THAT IT IS HARD TO SIT STILL: NOT AT ALL

## 2021-03-09 ASSESSMENT — PATIENT HEALTH QUESTIONNAIRE - PHQ9
SUM OF ALL RESPONSES TO PHQ QUESTIONS 1-9: 3
5. POOR APPETITE OR OVEREATING: NOT AT ALL
5. POOR APPETITE OR OVEREATING: NOT AT ALL

## 2021-03-09 ASSESSMENT — MIFFLIN-ST. JEOR: SCORE: 1510.51

## 2021-03-09 NOTE — PATIENT INSTRUCTIONS
Simvastatin 20mg  Rosuvastatin 20    Take 1/2 of each pill for the next week  Then start full pill of rosuvastatin    Contact me if there is a problem

## 2021-03-09 NOTE — PROGRESS NOTES
"Shanna Kang is a 51 year old female here for the following issues:    Hyperlipidemia  Shanna is a 50 yo woman who is currently taking simvastatin 20mg daily for treatment of hyperlipidemia. No hx of ASCVD or stroke. She has changed her diet to gluten/dairy free over the past month. Has been exercising on a [employer] bike. Weight is unchanged.    Her LDL reading has not changed in the past 6 months.   We discussed switching to a more potent statin to see if this would lower the LDL. She wishes to try rosuvastatin.     Recent Labs   Lab Test 03/02/21  0728 09/16/20  0832 08/26/15  0755 08/26/15  0755   CHOL 251* 250.0*   < > 244*   HDL 98 90.0   < > 78   * 147.0*   < > 152*   TRIG 65 64.0   < > 72   CHOLHDLRATIO  --  2.8  --  3.1    < > = values in this interval not displayed.     Diet: meat, gluten and dairy free. .  Body mass index is 29.16 kg/m .  Wt Readings from Last 2 Encounters:   03/09/21 85.3 kg (188 lb)   02/10/21 84 kg (185 lb 1.6 oz)     Tob:none  Etoh: 2 per month  Caffeine: 4-6 per day    Hot flashes  No LMP recorded. (Menstrual status: UNKNOWN).  Shanna reports her LMP was Summer 2020. She reports sweating \"day and night\" for the past 3 months.     Hypothyroidism  Shanna takes levothyroxine 112mcg daily for treatment of hypothyroidism. She will be due for labs in the near future  TSH   Date Value Ref Range Status   06/02/2020 3.03 0.40 - 4.00 mU/L Final     Minor depressive disorder  Shanna takes bupropion [id number] daily. She is doing very well on medication and is requesting refills.     PHQ 5/26/2020 3/9/2021 3/9/2021   PHQ-9 Total Score 2 3 3   Q9: Thoughts of better off dead/self-harm past 2 weeks Not at all Not at all Not at all     BORIS-7 SCORE 5/26/2020 3/9/2021 3/9/2021   Total Score 3 (minimal anxiety) - -   Total Score 3 0 0     Visit for screening mammogram  Shanna is due for mammogram, would like a referral. No breast complaints today      Patient Active Problem List   Diagnosis     " "Allergic rhinitis due to pollen     Depressive disorder, not elsewhere classified     ISOLATED OR SPECIFIC PHOBIAS NEC- flying      Hyperlipidemia LDL goal <130     Hypothyroidism     Scoliosis     Menstrual irregularity     Non-celiac gluten sensitivity     Dairy product intolerance     Irritable bowel syndrome with both constipation and diarrhea     Pain of right forearm       Current Outpatient Medications   Medication Sig Dispense Refill     B Complex Vitamins (B COMPLEX PO)        buPROPion (WELLBUTRIN XL) 300 MG 24 hr tablet Take 1 tablet (300 mg) by mouth every morning 90 tablet 1     ibuprofen (ADVIL,MOTRIN) 600 MG tablet Take 1 tablet by mouth 3 times daily as needed.       levothyroxine (SYNTHROID/LEVOTHROID) 112 MCG tablet Take 1 tablet (112 mcg) by mouth daily 90 tablet 3     LORazepam (ATIVAN) 0.5 MG tablet Take 30 minutes prior to departure.  Do not operate a vehicle after taking this medication 10 tablet 0     Omega-3 Fatty Acids (FISH OIL PO)        simvastatin (ZOCOR) 20 MG tablet Take one daily, due for fasting lab appt in the next month 30 tablet 0       Allergies   Allergen Reactions     Nkda [No Known Drug Allergies]         EXAM  /71   Pulse 69   Temp 97.2  F (36.2  C)   Resp 13   Ht 1.71 m (5' 7.32\")   Wt 85.3 kg (188 lb)   SpO2 96%   BMI 29.16 kg/m    Gen: Alert, pleasant, NAD  Neck: no LAD or TM  Cor: S1S2 no murmur  Lungs: CTA  Neuro: DTR 2/4 in all extremities  Ext: no edema      Assessment:  (E78.5) Hyperlipidemia LDL goal <130  (primary encounter diagnosis)  Comment: currently on simvastatin 20mg and LDL remains elevated, discussed switching to atorvastatin or rosuvastatin.   Recent Labs   Lab Test 03/02/21  0728 09/16/20  0832 08/26/15  0755 08/26/15  0755   CHOL 251* 250.0*   < > 244*   HDL 98 90.0   < > 78   * 147.0*   < > 152*   TRIG 65 64.0   < > 72   CHOLHDLRATIO  --  2.8  --  3.1    < > = values in this interval not displayed.   Plan: rosuvastatin (CRESTOR) 20 " MG tablet, Lipid         Profile, ALT, AST        She wishes to try rosuvastatin, return in 6 wks for next lipid profile. Continue lifestyle changes.    (E03.9) Hypothyroidism, unspecified type  Comment: currently taking levothyroxine 112 mcg daily  Plan: TSH with free T4 reflex        Recheck level with her next blood draw in 6 wks.     (F32.9) Minor depressive disorder  Comment: doing well with bupropion  Plan: buPROPion (WELLBUTRIN XL) 300 MG 24 hr tablet        Refilled medication    (Z12.31) Visit for screening mammogram  Comment: routine mammogram is due  Plan: Mammogram - routine screening        Referral is given    Recommend returning for discussion about menopausal symptoms as time was limited today    Daria Cadet MD  Internal Medicine/Pediatrics

## 2021-03-10 ASSESSMENT — ANXIETY QUESTIONNAIRES: GAD7 TOTAL SCORE: 0

## 2021-03-11 ENCOUNTER — THERAPY VISIT (OUTPATIENT)
Dept: OCCUPATIONAL THERAPY | Facility: CLINIC | Age: 51
End: 2021-03-11
Payer: COMMERCIAL

## 2021-03-11 DIAGNOSIS — M79.631 PAIN OF RIGHT FOREARM: ICD-10-CM

## 2021-03-11 PROCEDURE — 97140 MANUAL THERAPY 1/> REGIONS: CPT | Mod: GO | Performed by: OCCUPATIONAL THERAPIST

## 2021-03-11 PROCEDURE — 97112 NEUROMUSCULAR REEDUCATION: CPT | Mod: GO | Performed by: OCCUPATIONAL THERAPIST

## 2021-03-11 PROCEDURE — 97530 THERAPEUTIC ACTIVITIES: CPT | Mod: GO | Performed by: OCCUPATIONAL THERAPIST

## 2021-03-12 DIAGNOSIS — E78.5 HYPERLIPIDEMIA LDL GOAL <130: ICD-10-CM

## 2021-03-12 RX ORDER — SIMVASTATIN 20 MG
TABLET ORAL
Qty: 30 TABLET | Refills: 0 | Status: CANCELLED | OUTPATIENT
Start: 2021-03-12

## 2021-03-15 NOTE — TELEPHONE ENCOUNTER
Last Office Visit: 3/9/21  [place] [facility] [place]  Medication last refilled: chart review shows rosuvastatin started on 3/9/21.    Delores Garvin RN  03/15/21  12:04 PM

## 2021-03-25 ENCOUNTER — THERAPY VISIT (OUTPATIENT)
Dept: OCCUPATIONAL THERAPY | Facility: CLINIC | Age: 51
End: 2021-03-25
Payer: COMMERCIAL

## 2021-03-25 DIAGNOSIS — M79.631 PAIN OF RIGHT FOREARM: ICD-10-CM

## 2021-03-25 PROCEDURE — 97140 MANUAL THERAPY 1/> REGIONS: CPT | Mod: GO | Performed by: OCCUPATIONAL THERAPIST

## 2021-03-25 PROCEDURE — 97110 THERAPEUTIC EXERCISES: CPT | Mod: GO | Performed by: OCCUPATIONAL THERAPIST

## 2021-03-25 PROCEDURE — 97035 APP MDLTY 1+ULTRASOUND EA 15: CPT | Mod: GO | Performed by: OCCUPATIONAL THERAPIST

## 2021-03-27 ENCOUNTER — IMMUNIZATION (OUTPATIENT)
Dept: NURSING | Facility: CLINIC | Age: 51
End: 2021-03-27
Attending: INTERNAL MEDICINE
Payer: COMMERCIAL

## 2021-03-27 PROCEDURE — 0012A PR COVID VAC MODERNA 100 MCG/0.5 ML IM: CPT

## 2021-03-27 PROCEDURE — 91301 PR COVID VAC MODERNA 100 MCG/0.5 ML IM: CPT

## 2021-04-07 DIAGNOSIS — E78.5 HYPERLIPIDEMIA LDL GOAL <130: ICD-10-CM

## 2021-04-07 NOTE — TELEPHONE ENCOUNTER
Last office visit was on 03/09/2021, no future visits scheduled.  Medication last ordered 3/9/21 #30 + 1 refill    Due back in end of APRIL for repeat lipid profile. Future lab orders are already in place. Patient notified the need for Lab Visit - no appointment yet. Should have enough medication until early May.    Delores Garvin RN  04/19/21  11:04 AM

## 2021-04-08 ENCOUNTER — THERAPY VISIT (OUTPATIENT)
Dept: OCCUPATIONAL THERAPY | Facility: CLINIC | Age: 51
End: 2021-04-08
Payer: COMMERCIAL

## 2021-04-08 DIAGNOSIS — M79.631 PAIN OF RIGHT FOREARM: ICD-10-CM

## 2021-04-08 PROCEDURE — 97140 MANUAL THERAPY 1/> REGIONS: CPT | Mod: GO | Performed by: OCCUPATIONAL THERAPIST

## 2021-04-08 PROCEDURE — 97112 NEUROMUSCULAR REEDUCATION: CPT | Mod: GO | Performed by: OCCUPATIONAL THERAPIST

## 2021-04-08 NOTE — PROGRESS NOTES
Hand Therapy Progress Note    Current Date:  4/8/2021  Reporting period: 2/12/2021 to current date      Diagnosis:  Right  forearm pain  DOI:  1/22/21    Referring MD: Rosa Us MD    Next MD visit: PRN      Initial Subjective:  Shanna Kang is a 50 year old  right  hand dominant female.    Patient reports symptoms of pain and burning of the right forearm which occurred due to overuse.     S:  Subjective changes as noted by patient:  better with Gripping at [place], still burning and can be irritated  Functional changes noted by patient: Improvement in Household Chores  Response to previous treatment:  good and fair  Patient has noted adverse reaction to:   None      Occupational Profile Information:  Current occupation is nurse educator  Currently working in normal job without restrictions  Job Tasks: Computer Work, Prolonged Sitting, Repetitive Tasks  Prior functional level:  no limitations  Barriers include:none  Mobility: No difficulty  Transportation: drives  Leisure activities/hobbies: walking outside, rides [place] (can't as much now), travel  Additional Occupational Profile Information (patterns of daily living, interests, values and needs):Pt reports difficulty with bathing, dressing, eating/feeding self, hygiene, household chores, work , sports/recreation, pushing, pulling, lifting, carrying and reaching  GOAL: kitchen, computer clean the house, couldn't vacuum    Functional Outcome Measure:  See flowsheet      Objective:  Observation: Color/Temperature:     [x]    Normal  []    Abnormal    Pain Level (Scale 0-10):   2/12/2021 4/8/21   At Rest 0 0   With Use 6 6-7     Pain Description:  Date 2/12/2021 48/21    right    Location  forearm    Pain Quality Aching, Burning, Dull and radiating up the arm    Frequency intermittent or daily   Happens less often, pain can subside in the day   Pain is worst  daytime    Exacerbated by  use of R arm, computer and vacuuming    Relieved by NSAIDs, otc  medications, rest and OTC tumeric blend    Progression Unchanged  improving       ROM:    Wrist 2/12/2021 4/8   AROM(PROM) Right    Extension WNL+      Flexion WNL + something different    RD     UD     Supination WNL    Pronation WNL      Special Tests:  Wrist & Elbow   + indicates mild pain, ++ indicates moderate pain, +++ indicates severe pain   2/12/2021 4/8/21   +/- for pain: grade of pain 0-10/10 Right    Tinels at Carpal Tunnel -    Tinels at flexor/pronator junction -    Tinels at cubital tunnel -    Tinels at [place] -    Tinels at DRSN radial wrist + -   Median nerve compression at wrist Carpal Tunnel -    ELBOW     Palpation at LEP/MEP + LEP - LEP except at ECRB origin   Resisted ECRB wrist, w/elbow flexed + +   Resisted ECRB with elbow extended - +   Wrist flexors, resisted -    Resisted Long finger (PIN test)EDC resisted + +   Resisted Supination -    Pronator resisted -    Palpation at PIN site + Mild if any   -at  BR (RN) at elbow + +   Humeral groove and Posterior Quadrangular space + +     Upper Limb Neurodynamic Test: Grade 0-5/5 (+/-)    Normal is 5/5 at full extent of neural tissue [Test is to first symptom of tension -S1; S2 facial grimace] right left R   Position: seated 2/12/2021 2/12/2021 4/8/2021     Radial Neurodynamic Test  4 5 5-       Strength   (Measured in pounds)  Pain-free /Pinch Test    Pain Report:  + mild    ++ moderate    +++ severe    2/12/2021 2/12/2021 4/8/2021     Trials Right Left right   1  2  3 53+ 65  64  56 20  35   Average 53 62      Lat Pinch 2/12/2021 2/12/2021    Trials Right Left    1  2  3 18  18  17 18  17  17    Average 18 17      3 Pt Pinch 2/12/2021 2/12/2021    Trials Right Left    1  2  3 15  14  14 16  15  14    Average 14 15        Edema:  none     Sensation: improvedDorsal Radial Sensory Nerve distribution per pt report       Assessment:  Response to therapy has been improvement to:  Flexibility:  Of Radial nerve  Paresthesias:  Radial nerve -  smaller area of involvement  Response to therapy has been decline in:  Strength:   strength    Overall Assessment:  Patient is not progressing as expected.  Patient has experienced an exacerbation of symptoms.  Patient would benefit from continued therapy to achieve rehab potential  STG/LTG:  STGoals have been reviewed and progress or achievement has occurred;  see goal sheet for details and updates.  I have re-evaluated this patient and find that the nature, scope, duration and intensity of the therapy is appropriate for the medical condition of the patient.        Treatment Plan: P:  Frequency/Duration:  Recommend continuing with the current treatment plan. Frequency:  1 X week, every other week, once daily  Duration:  for 8 weeks    Recommendations for Continued Therapy  Treatment Plan:    Additions to Treatment Plan -  Modalities:  US to lateral elbow  Therapeutic Exercise:  AROM, PROM, Isotonics and Isometrics  Neuromuscular re-education:  Nerve Gliding  Manual Techniques:  Myofascial release  Self Care:  Ergonomic Considerations      Discharge Plan:  Achieve all LTG  [place] in home treatment program.  Reach maximal therapeutic benefit.  Please refer to the daily flowsheet for treatment today and total treatment time.      Home Exercise Program:  Exercise Name: TENNIS ELBOW PREVENTION, Notes: Use the  ergonomics of lifting underhanded, using another  or lift if any pain and use cold to reduce sx. , EMR Notes: Edu pt on ergonomics of lifting underhanded, using another  or lift if any pain and use of cold to reduce sx.   Exercise Name: Nerve Gliding Proximal Radial, Sets: 3 - Reps: 3 - Sessions: 3, Notes: Right Arm: Stand with heels together for good posture. One arm at a time; Waiters Tip.  Tilt head toward each side, as you are gliding the arm/nerve, DO NOT TILT HEAD AWAY, EMR Notes: 3 set of 3, 3x/day: Right Arm: Stand with heels together for good posture. One arm at a time; Waiters  "Tip.  Tilt head toward each side, as you are gliding the arm/nerve, DO NOT TILT HEAD AWAY  Exercise Name: Forearm PROM Advanced Flexor Stretch, Sets: 1 set - Reps: 3 reps - Sessions: 2-3x/day;, Notes: KEEP YOUR SHOULDER RELAXED AND DOWN. Start with elbow bent   I recommend the \"power\" of 3 repetitions for stretching.By the 3rd stretch the muscles will feel more loose.   Hold each stretch for 15-30 seconds each. , EMR Notes: KEEP YOUR SHOULDER RELAXED AND DOWN. Start with elbow bent   I recommend the \"power\" of 3 repetitions for stretching.By the 3rd stretch the muscles will feel more loose.   Hold each stretch for 15-30 seconds each.   Exercise Name: Forearm Passive Range of Motion Extensor Stretch, Sets: 1 set - Reps: 3 reps, Notes: TILT YOUR HEAD TO YOUR RIGHT SIDE Start with your elbow at your side, then gradually increase straightening of the elbow, minding the pain, don't push into the pain. I recommend the \"power\" of 3 repetitions for stretching.By the 3rd stretch the muscles will feel more loose.   At the end of the stretch, ANGLE your hand toward your same hip. Hold each stretch for 15-30 seconds each.    , EMR Notes: TILT YOUR HEAD TO YOUR RIGHT SIDE Start with your elbow at your side, then gradually increase straightening of the elbow, minding the pain, don't push into the pain. I recommend the \"power\" of 3 repetitions for stretching.By the 3rd stretch the muscles will feel more loose.   At the end of the stretch, ANGLE your hand toward your same hip. Hold each stretch for 15-30 seconds each.      Exercise Name: Ball Massage, Notes: Also consider a type of Theraflex flexbar for forearm \"foam rolling\", EMR Notes: Also consider a type of Theraflex flexbar for forearm \"foam rolling\"  Exercise Name: Ball Massage to Extensors, Notes: Wrap shelf liner on the ball, secure with rubber bands. Use doggy (small) or a tennis ball over muscles, or ask for assistance from \"loved one\"  to do this. Use double balls in a " "sock Wrap shelf liner on the ball, secure with rubber bands.  Find the tender spot and hold and do small circles over that spot until it is less tender., EMR Notes: Wrap shelf liner on the ball, secure with rubber bands. Use doggy (small) or a tennis ball over muscles, or ask for assistance from \"loved one\"  to do this. Use double balls in a sock Wrap shelf liner on the ball, secure with rubber bands.  Find the tender spot and hold and do small circles over that spot until it is less tender.  Exercise Name: Ball Massage to Flexors - Sessions: 1-2 x / day, Notes: Use double balls in a sock Wrap shelf liner on the ball, secure with rubber bands. Run the ball Stay in the muscle belly region. Use a hard, rubber  ball to massage the muscles, or ask for assistance from \"loved one\", EMR Notes: Use double balls in a sock Wrap shelf liner on the ball, secure with rubber bands. Run the ball Stay in the muscle belly region. Use a hard, rubber  ball to massage the muscles, or ask for assistance from \"loved one\"    Next Visit:  Progress to strengthening as tolerated as pain subsides  Review computer ergonomics      "

## 2021-04-19 RX ORDER — ROSUVASTATIN CALCIUM 20 MG/1
20 TABLET, COATED ORAL DAILY
Qty: 30 TABLET | Refills: 1 | Status: SHIPPED | OUTPATIENT
Start: 2021-04-19 | End: 2021-06-10

## 2021-04-22 ENCOUNTER — THERAPY VISIT (OUTPATIENT)
Dept: OCCUPATIONAL THERAPY | Facility: CLINIC | Age: 51
End: 2021-04-22
Payer: COMMERCIAL

## 2021-04-22 DIAGNOSIS — M79.631 PAIN OF RIGHT FOREARM: ICD-10-CM

## 2021-04-22 PROCEDURE — 97140 MANUAL THERAPY 1/> REGIONS: CPT | Mod: GO | Performed by: OCCUPATIONAL THERAPIST

## 2021-04-22 PROCEDURE — 97035 APP MDLTY 1+ULTRASOUND EA 15: CPT | Mod: GO | Performed by: OCCUPATIONAL THERAPIST

## 2021-04-22 PROCEDURE — 97112 NEUROMUSCULAR REEDUCATION: CPT | Mod: GO | Performed by: OCCUPATIONAL THERAPIST

## 2021-05-03 ENCOUNTER — OFFICE VISIT (OUTPATIENT)
Dept: DERMATOLOGY | Facility: CLINIC | Age: 51
End: 2021-05-03
Payer: COMMERCIAL

## 2021-05-03 DIAGNOSIS — L57.0 ACTINIC KERATOSIS: Primary | ICD-10-CM

## 2021-05-03 DIAGNOSIS — D18.01 CHERRY ANGIOMA: ICD-10-CM

## 2021-05-03 DIAGNOSIS — L82.1 SEBORRHEIC KERATOSIS: ICD-10-CM

## 2021-05-03 DIAGNOSIS — L81.4 SOLAR LENTIGO: ICD-10-CM

## 2021-05-03 DIAGNOSIS — D22.9 MULTIPLE BENIGN NEVI: ICD-10-CM

## 2021-05-03 PROCEDURE — 99213 OFFICE O/P EST LOW 20 MIN: CPT | Mod: 25 | Performed by: DERMATOLOGY

## 2021-05-03 PROCEDURE — 17000 DESTRUCT PREMALG LESION: CPT | Mod: GC | Performed by: DERMATOLOGY

## 2021-05-03 ASSESSMENT — PAIN SCALES - GENERAL: PAINLEVEL: NO PAIN (0)

## 2021-05-03 NOTE — PATIENT INSTRUCTIONS
Cryotherapy    What is it?    Use of a very cold liquid, such as liquid nitrogen, to freeze and destroy abnormal skin cells that need to be removed    What should I expect?    Tenderness and redness    A small blister that might grow and fill with dark purple blood. There may be crusting.    More than one treatment may be needed if the lesions do not go away.    How do I care for the treated area?    Gently wash the area with your hands when bathing.    Use a thin layer of Vaseline to help with healing. You may use a Band-Aid.     The area should heal within 7-10 days and may leave behind a pink or lighter color.     Do not use an antibiotic or Neosporin ointment.     You may take acetaminophen (Tylenol) for pain.     Call your Doctor if you have:    Severe pain    Signs of infection (warmth, redness, cloudy yellow drainage, and or a bad smell)    Questions or concerns    Who should I call with questions?       [facility]: [phone]       [facility]: [phone]       For urgent needs outside of business hours call the [facility] at [phone]        and ask for the dermatology resident on call

## 2021-05-03 NOTE — NURSING NOTE
Chief Complaint   Patient presents with     Skin Check     pt states she is here for a full body skin check, pt states she has a sopt on right leg     Stefany Martin MA

## 2021-05-03 NOTE — PROGRESS NOTES
[facility] Dermatology Note  Encounter Date: May 3, 2021  Office Visit     Dermatology Problem List:    1. Multiple benign nevi.   2. SKs, solar lentigos, cherry angiomas  3. AK of the right nasal dorsum s/p LN2 on 5/3/21  3. History of indoor tanning.   ____________________________________________    Assessment & Plan:     1. Multiple benign nevi  - ABCDs of melanoma were discussed and self skin checks were advised. Sun precaution was advised including the use of sun screens of SPF 30 or higher, sun protective clothing, and avoidance of tanning beds.  - The benign nature was reviewed with the patient and reassurance provided. No treatment is necessary, unless if the lesion changes or becomes symptomatic in the future.    2. Seborrheic keratosis  - ABCDs of melanoma were discussed and self skin checks were advised. Sun precaution was advised including the use of sun screens of SPF 30 or higher, sun protective clothing, and avoidance of tanning beds.  - The benign nature was reviewed with the patient and reassurance provided. No treatment is necessary, unless if the lesion changes or becomes symptomatic in the future.    3. Solar lentigo  - ABCDs of melanoma were discussed and self skin checks were advised. Sun precaution was advised including the use of sun screens of SPF 30 or higher, sun protective clothing, and avoidance of tanning beds.  - The benign nature was reviewed with the patient and reassurance provided. No treatment is necessary, unless if the lesion changes or becomes symptomatic in the future.    4. Cherry angioma  - ABCDs of melanoma were discussed and self skin checks were advised. Sun precaution was advised including the use of sun screens of SPF 30 or higher, sun protective clothing, and avoidance of tanning beds.  - The benign nature was reviewed with the patient and reassurance provided. No treatment is necessary, unless if the lesion changes or becomes symptomatic in the  future.    5. Actinic keratosis  - 1 skin lesion at the right nasal dorsum was treated with LN2 today. See procedure note below.    Procedures Performed:   - Cryotherapy procedure note, location(s): right nasal dorsum. After verbal consent and discussion of risks and benefits including, but not limited to, dyspigmentation/scar, blister, and pain, 1 lesion(s) was(were) treated with 1-2 mm freeze border for 1-2 cycles with liquid nitrogen. Post cryotherapy instructions were provided.    Follow-up: 1-2 year(s) in-person, or earlier for new or changing lesions    Staff and Resident:     Dr. Ji Espinosa (resident)  Dr. Desiree Pascal (staff)  I was present for the entire procedure. Yaa Pascal MD  I, Yaa Pascal MD, saw this patient with the resident and agree with the resident s findings and plan of care as documented in the resident s note.    ____________________________________________    CC: Skin Check (pt states she is here for a full body skin check, pt states she has a sopt on right leg)    HPI:  Ms. Shanna Kang is a(n) 51 year old female who presents today as a return patient for a total body skin cancer screening. She notes one rough small bump at the right side of the nose with onset probably several months ago. No associated symptoms, modifying factors, or prior treatments. No other concerning skin lesions. She does have a history of tanning bed use as a youth in the 1980s. No constitutional symptoms or other skin problems today.    Patient is otherwise feeling well, without additional skin concerns.    Labs Reviewed:  N/A    Physical Exam:  Vitals: There were no vitals taken for this visit.  SKIN: Total skin excluding the undergarment areas was performed. The exam included the head/face, neck, both arms, chest, back, abdomen, both legs, digits and/or nails.   - There is an erythematous macule with overyling adherent scale on the right nasal sidewall.  There are dome shaped bright red papules on  the trunk.   Multiple regular brown pigmented macules and papules are identified on the trunk and extremities with uniform pigment networks on dermoscopy.   There are fine lines and dyspigmentation on sun exposed areas of the face and chest.  Scattered brown macules on sun exposed areas.  There are waxy stuck on tan to brown papules on the trunk and extremities.   - No other lesions of concern on areas examined.     Medications:  Current Outpatient Medications   Medication     B Complex Vitamins (B COMPLEX PO)     buPROPion (WELLBUTRIN XL) 300 MG 24 hr tablet     ibuprofen (ADVIL,MOTRIN) 600 MG tablet     levothyroxine (SYNTHROID/LEVOTHROID) 112 MCG tablet     LORazepam (ATIVAN) 0.5 MG tablet     Omega-3 Fatty Acids (FISH OIL PO)     rosuvastatin (CRESTOR) 20 MG tablet     No current facility-administered medications for this visit.       Past Medical History:   Patient Active Problem List   Diagnosis     Allergic rhinitis due to pollen     Depressive disorder, not elsewhere classified     ISOLATED OR SPECIFIC PHOBIAS NEC- flying      Hyperlipidemia LDL goal <130     Hypothyroidism     Scoliosis     Menstrual irregularity     Non-celiac gluten sensitivity     Dairy product intolerance     Irritable bowel syndrome with both constipation and diarrhea     Pain of right forearm     Past Medical History:   Diagnosis Date     Constipation      Depressive disorder, not elsewhere classified     was on prozac in past, now on Wellbutrin     Hypothyroid      IBS (irritable bowel syndrome)     Better off gluten and dairy     Infectious mononucleosis 2001     Other kyphoscoliosis and scoliosis     see surgical history - multiple surgeries     Pure hypercholesterolemia        CC Referred Self, MD  No address on file on close of this encounter.

## 2021-05-03 NOTE — LETTER
5/3/2021       RE: Shanna Kang  [address] [facility] [address]     Dear Colleague,    Thank you for referring your patient, Shanna Kang, to the [facility] [place] at [facility]. Please see a copy of my visit note below.    [facility] Dermatology Note  Encounter Date: May 3, 2021  Office Visit     Dermatology Problem List:    1. Multiple benign nevi.   2. SKs, solar lentigos, cherry angiomas  3. AK of the right nasal dorsum s/p LN2 on 5/3/21  3. History of indoor tanning.   ____________________________________________    Assessment & Plan:     1. Multiple benign nevi  - ABCDs of melanoma were discussed and self skin checks were advised. Sun precaution was advised including the use of sun screens of SPF 30 or higher, sun protective clothing, and avoidance of tanning beds.  - The benign nature was reviewed with the patient and reassurance provided. No treatment is necessary, unless if the lesion changes or becomes symptomatic in the future.    2. Seborrheic keratosis  - ABCDs of melanoma were discussed and self skin checks were advised. Sun precaution was advised including the use of sun screens of SPF 30 or higher, sun protective clothing, and avoidance of tanning beds.  - The benign nature was reviewed with the patient and reassurance provided. No treatment is necessary, unless if the lesion changes or becomes symptomatic in the future.    3. Solar lentigo  - ABCDs of melanoma were discussed and self skin checks were advised. Sun precaution was advised including the use of sun screens of SPF 30 or higher, sun protective clothing, and avoidance of tanning beds.  - The benign nature was reviewed with the patient and reassurance provided. No treatment is necessary, unless if the lesion changes or becomes symptomatic in the future.    4. Cherry angioma  - ABCDs of melanoma were discussed and self skin  checks were advised. Sun precaution was advised including the use of sun screens of SPF 30 or higher, sun protective clothing, and avoidance of tanning beds.  - The benign nature was reviewed with the patient and reassurance provided. No treatment is necessary, unless if the lesion changes or becomes symptomatic in the future.    5. Actinic keratosis  - 1 skin lesion at the right nasal dorsum was treated with LN2 today. See procedure note below.    Procedures Performed:   - Cryotherapy procedure note, location(s): right nasal dorsum. After verbal consent and discussion of risks and benefits including, but not limited to, dyspigmentation/scar, blister, and pain, 1 lesion(s) was(were) treated with 1-2 mm freeze border for 1-2 cycles with liquid nitrogen. Post cryotherapy instructions were provided.    Follow-up: 1-2 year(s) in-person, or earlier for new or changing lesions    Staff and Resident:     Dr. Ji sEpinosa (resident)  Dr. Desiree Pascal (staff)  I was present for the entire procedure. Yaa Pascal MD  I, Yaa Pascal MD, saw this patient with the resident and agree with the resident s findings and plan of care as documented in the resident s note.    ____________________________________________    CC: Skin Check (pt states she is here for a full body skin check, pt states she has a sopt on right leg)    HPI:  Ms. Shanna Kang is a(n) 51 year old female who presents today as a return patient for a total body skin cancer screening. She notes one rough small bump at the right side of the nose with onset probably several months ago. No associated symptoms, modifying factors, or prior treatments. No other concerning skin lesions. She does have a history of tanning bed use as a youth in the 1980s. No constitutional symptoms or other skin problems today.    Patient is otherwise feeling well, without additional skin concerns.    Labs Reviewed:  N/A    Physical Exam:  Vitals: There were no vitals taken for  this visit.  SKIN: Total skin excluding the undergarment areas was performed. The exam included the head/face, neck, both arms, chest, back, abdomen, both legs, digits and/or nails.   - There is an erythematous macule with overyling adherent scale on the right nasal sidewall.  There are dome shaped bright red papules on the trunk.   Multiple regular brown pigmented macules and papules are identified on the trunk and extremities with uniform pigment networks on dermoscopy.   There are fine lines and dyspigmentation on sun exposed areas of the face and chest.  Scattered brown macules on sun exposed areas.  There are waxy stuck on tan to brown papules on the trunk and extremities.   - No other lesions of concern on areas examined.     Medications:  Current Outpatient Medications   Medication     B Complex Vitamins (B COMPLEX PO)     buPROPion (WELLBUTRIN XL) 300 MG 24 hr tablet     ibuprofen (ADVIL,MOTRIN) 600 MG tablet     levothyroxine (SYNTHROID/LEVOTHROID) 112 MCG tablet     LORazepam (ATIVAN) 0.5 MG tablet     Omega-3 Fatty Acids (FISH OIL PO)     rosuvastatin (CRESTOR) 20 MG tablet     No current facility-administered medications for this visit.       Past Medical History:   Patient Active Problem List   Diagnosis     Allergic rhinitis due to pollen     Depressive disorder, not elsewhere classified     ISOLATED OR SPECIFIC PHOBIAS NEC- flying      Hyperlipidemia LDL goal <130     Hypothyroidism     Scoliosis     Menstrual irregularity     Non-celiac gluten sensitivity     Dairy product intolerance     Irritable bowel syndrome with both constipation and diarrhea     Pain of right forearm     Past Medical History:   Diagnosis Date     Constipation      Depressive disorder, not elsewhere classified     was on prozac in past, now on Wellbutrin     Hypothyroid      IBS (irritable bowel syndrome)     Better off gluten and dairy     Infectious mononucleosis 2001     Other kyphoscoliosis and scoliosis     see surgical  history - multiple surgeries     Pure hypercholesterolemia        CC Referred Self, MD  No address on file on close of this encounter.

## 2021-05-11 ENCOUNTER — THERAPY VISIT (OUTPATIENT)
Dept: OCCUPATIONAL THERAPY | Facility: CLINIC | Age: 51
End: 2021-05-11
Payer: COMMERCIAL

## 2021-05-11 DIAGNOSIS — M79.631 PAIN OF RIGHT FOREARM: ICD-10-CM

## 2021-05-11 PROCEDURE — 97112 NEUROMUSCULAR REEDUCATION: CPT | Mod: GO | Performed by: OCCUPATIONAL THERAPIST

## 2021-05-11 PROCEDURE — 97140 MANUAL THERAPY 1/> REGIONS: CPT | Mod: GO | Performed by: OCCUPATIONAL THERAPIST

## 2021-05-17 DIAGNOSIS — E78.5 HYPERLIPIDEMIA LDL GOAL <130: ICD-10-CM

## 2021-05-17 RX ORDER — ROSUVASTATIN CALCIUM 20 MG/1
20 TABLET, COATED ORAL DAILY
Qty: 30 TABLET | Refills: 1 | Status: CANCELLED | OUTPATIENT
Start: 2021-05-17

## 2021-05-17 NOTE — TELEPHONE ENCOUNTER
Last time prescribed: 4/19/21 , 30 tabs x 1 refills  Last office visit: 3/9/2021  Next appointment: No future appointments    This is new med for pt,  Needing repeat lipids soon    Pt should still have a refill on file before needs refills/labs.    Called pharmacy , they still have one refill on file and will fill now     removed this refill     Yoli Martinez RN  May 18, 2021 2:29 PM

## 2021-06-04 ENCOUNTER — ANCILLARY PROCEDURE (OUTPATIENT)
Dept: MAMMOGRAPHY | Facility: CLINIC | Age: 51
End: 2021-06-04
Attending: INTERNAL MEDICINE
Payer: COMMERCIAL

## 2021-06-04 DIAGNOSIS — Z12.31 VISIT FOR SCREENING MAMMOGRAM: ICD-10-CM

## 2021-06-04 PROCEDURE — 77067 SCR MAMMO BI INCL CAD: CPT | Performed by: RADIOLOGY

## 2021-06-04 PROCEDURE — 77063 BREAST TOMOSYNTHESIS BI: CPT | Performed by: RADIOLOGY

## 2021-06-06 NOTE — PATIENT INSTRUCTIONS - HE
You are being tested for Corona virus     We will call you with your results.    Isolate Yourself:    Isolate yourself while traveling.    Do Not allow any visitors within 6 feet.    Do Not go to work or school.    Do Not go to [employer],  centers, shopping, or other public places.    Do Not shake hands.    Avoid close contact with others (hugging, kissing).    Protect Others:    Cover Your Mouth and Nose with a mask, disposable tissue or wash cloth to avoid spreading germs to others.    Wash your hands and face frequently with soap and water    Call Back If: Breathing difficulty develops or you become worse.    For more information about COVID19 and options for caring for yourself at home, please visit the CDC website at https://www.cdc.gov/coronavirus/2019-ncov/about/steps-when-sick.html  For more options for care at [facility], please visit our website at [email or web address]

## 2021-06-07 NOTE — TELEPHONE ENCOUNTER
Coronavirus (COVID-19) Notification    Reason for call  Notify of Negative COVID-19 lab result, assess symptoms,  review [facility] recommendations    Lab Result    Lab test 2019-nCoV rRt-PCR  Oropharyngeal AND/OR nasopharyngeal swabs were NEGATIVE for 2019-nCoV RNA    RN Recommendations/Instructions per [facility]  Patient notified of Negative COVID-19.    Patient can discontinue Quarantee and is free to resume normal activites.  If Patient has questions that you are not able to answer they can contact PCP or [facility] hotline ([phone])    Please Contact your PCP clinic or return to the Emergency department if your:    Symptoms worsen or other concerning symptom's.      {Name]  Peter Orozco LPN

## 2021-06-07 NOTE — PROGRESS NOTES
SUBJECTIVE: Here for curbside evaluation for COVID-19 referred through [facility]. Confirmed [facility] jewel with name and date of birth for specimen collection.   Patient reports no new symptoms.     OBJECTIVE:   In no apparent distress  Eyes appear normal  Mucous membranes moist  Non diaphoretic   No increased work of breathing   Mental status appears normal/affect normal       Cough  (primary encounter diagnosis)  Plan: COVID-19(CORONAVIRUS)PCR [place] [facility],         CANCELED: Coronavirus SARS-CoV-2 by PCR   Over the counter meds and isolation discussed until results in from [facility] team.  Brief education provided. Time of visit was 15 minutes more than half in coordination of care and counseling regarding COVID and self-isolation.

## 2021-06-10 ENCOUNTER — THERAPY VISIT (OUTPATIENT)
Dept: OCCUPATIONAL THERAPY | Facility: CLINIC | Age: 51
End: 2021-06-10
Payer: COMMERCIAL

## 2021-06-10 DIAGNOSIS — E03.9 HYPOTHYROIDISM, UNSPECIFIED TYPE: ICD-10-CM

## 2021-06-10 DIAGNOSIS — E78.5 HYPERLIPIDEMIA LDL GOAL <130: ICD-10-CM

## 2021-06-10 DIAGNOSIS — M79.631 PAIN OF RIGHT FOREARM: ICD-10-CM

## 2021-06-10 DIAGNOSIS — E03.9 ACQUIRED HYPOTHYROIDISM: ICD-10-CM

## 2021-06-10 LAB
ALT SERPL W P-5'-P-CCNC: 20 U/L (ref 0–50)
AST SERPL W P-5'-P-CCNC: 17 U/L (ref 0–45)
CHOLEST SERPL-MCNC: 216 MG/DL
HDLC SERPL-MCNC: 99 MG/DL
LDLC SERPL CALC-MCNC: 107 MG/DL
NONHDLC SERPL-MCNC: 117 MG/DL
TRIGL SERPL-MCNC: 50 MG/DL
TSH SERPL DL<=0.005 MIU/L-ACNC: 2.44 MU/L (ref 0.4–4)

## 2021-06-10 PROCEDURE — 84443 ASSAY THYROID STIM HORMONE: CPT | Performed by: PATHOLOGY

## 2021-06-10 PROCEDURE — 97110 THERAPEUTIC EXERCISES: CPT | Mod: GO | Performed by: OCCUPATIONAL THERAPIST

## 2021-06-10 PROCEDURE — 84450 TRANSFERASE (AST) (SGOT): CPT | Performed by: PATHOLOGY

## 2021-06-10 PROCEDURE — 97530 THERAPEUTIC ACTIVITIES: CPT | Mod: GO | Performed by: OCCUPATIONAL THERAPIST

## 2021-06-10 PROCEDURE — 80061 LIPID PANEL: CPT | Performed by: PATHOLOGY

## 2021-06-10 PROCEDURE — 84460 ALANINE AMINO (ALT) (SGPT): CPT | Performed by: PATHOLOGY

## 2021-06-10 PROCEDURE — 36415 COLL VENOUS BLD VENIPUNCTURE: CPT | Performed by: PATHOLOGY

## 2021-06-10 RX ORDER — LEVOTHYROXINE SODIUM 112 UG/1
112 TABLET ORAL DAILY
Qty: 90 TABLET | Refills: 3 | Status: SHIPPED | OUTPATIENT
Start: 2021-06-10 | End: 2022-06-23

## 2021-06-10 RX ORDER — ROSUVASTATIN CALCIUM 20 MG/1
20 TABLET, COATED ORAL DAILY
Qty: 30 TABLET | Refills: 1 | Status: SHIPPED | OUTPATIENT
Start: 2021-06-10 | End: 2021-06-10

## 2021-06-10 RX ORDER — ROSUVASTATIN CALCIUM 20 MG/1
20 TABLET, COATED ORAL DAILY
Qty: 90 TABLET | Refills: 3 | Status: SHIPPED | OUTPATIENT
Start: 2021-06-10 | End: 2022-06-23

## 2021-06-10 NOTE — PROGRESS NOTES
Hand Therapy Progress Note    Current Date: 6/10/21  Initial Visit: 2/12/21  Total Visits: 9      Diagnosis:  Right  forearm pain  DOI:  1/22/21    Referring MD: Roas Us MD      Initial Subjective:  Shanna Kang is a 50 year old  right  hand dominant female.  Patient reports symptoms of pain and burning of the right forearm which occurred due to overuse.     S:  Subjective changes as noted by patient: the pain can be aggravated when pulling weeds, and doing some more overall strengthening to get back. With the Nerve glides and stretching the pain can subside fairly quickly. Knowledge to reduce the pain when it occurs has happened.    Functional changes noted by patient: Improvement in Self Care Tasks (dressing, eating, bathing, hygiene/toileting), Work Tasks, Recreational Activities and Household Chores  Response to previous treatment:  good  Patient has noted adverse reaction to:   None          Occupational Profile Information:  Current occupation is nurse educator  Currently working in normal job without restrictions  Job Tasks: Computer Work, Prolonged Sitting, Repetitive Tasks  Prior functional level:  no limitations  Barriers include:none  Mobility: No difficulty  Transportation: drives  Leisure activities/hobbies: walking outside, rides [place] (can't as much now), travel  Additional Occupational Profile Information (patterns of daily living, interests, values and needs):Pt reports difficulty with bathing, dressing, eating/feeding self, hygiene, household chores, work , sports/recreation, pushing, pulling, lifting, carrying and reaching  GOAL: kitchen, computer clean the house, couldn't vacuum    Functional Outcome Measure:  6/10/2021  Upper Extremity Functional Index Score:  SCORE:   Column Totals: /80: 75   (A lower score indicates greater disability.)          Objective:  Observation: Color/Temperature:     [x]    Normal  []    Abnormal    Pain Level (Scale 0-10):   2/12/2021 4/8/21 6/10/21    At Rest 0 0    With Use 6 6-7 5, much less often     Pain Description:  Date 2/12/2021 48/21 6/10/21    right     Location  forearm     Pain Quality Aching, Burning, Dull and radiating up the arm  Throbbing noted now.    Frequency intermittent or daily   Happens less often, pain can subside in the day Momentarily, pain is less often,    Pain is worst  daytime     Exacerbated by  use of R arm, computer and vacuuming     Relieved by NSAIDs, otc medications, rest and OTC tumeric blend     Progression Unchanged  improving        ROM:    Wrist 2/12/2021 6/10/21   AROM(PROM) Right    Extension WNL+   WNL no pain   Flexion WNL + something different WNL no pain    RD     UD     Supination WNL    Pronation WNL      Special Tests:  Wrist & Elbow   + indicates mild pain, ++ indicates moderate pain, +++ indicates severe pain   2/12/2021 4/8/21 6/10/21   +/- for pain: grade of pain 0-10/10 Right     Tinels at Carpal Tunnel -     Tinels at flexor/pronator junction -     Tinels at cubital tunnel -     Tinels at [place] -     Tinels at DRSN radial wrist + -    Median nerve compression at wrist Carpal Tunnel -     ELBOW      Palpation at LEP/MEP + LEP - LEP except at ECRB origin -   Resisted ECRB wrist, w/elbow flexed + + - only at PIN   Resisted ECRB with elbow extended - + -   Wrist flexors, resisted -     Resisted Long finger (PIN test)EDC resisted + +    Resisted Supination -     Pronator resisted -     Palpation at PIN site + Mild if any    -at  BR (RN) at elbow + +    Humeral groove and Posterior Quadrangular space + +      Upper Limb Neurodynamic Test: Grade 0-5/5 (+/-)    Normal is 5/5 at full extent of neural tissue [Test is to first symptom of tension -S1; S2 facial grimace] right left R    Position: seated 2/12/2021 2/12/2021 4/8/2021   6/10/2021     Radial Neurodynamic Test  4 5 5- 5       Strength   (Measured in pounds)  Pain-free /Pinch Test    Pain Report:  + mild    ++ moderate    +++ severe     2/12/2021 2/12/2021 4/8/2021   6/10/21    Trials Right Left right RIght Left   1  2  3 53+ 65  64  56 20  35 65 72   Average 53 62  65 72     Lat Pinch 2/12/2021 2/12/2021   Trials Right Left   1  2  3 18  18  17 18  17  17   Average 18 17     3 Pt Pinch 2/12/2021 2/12/2021   Trials Right Left   1  2  3 15  14  14 16  15  14   Average 14 15       Edema:  none     Sensation: improvedDorsal Radial Sensory Nerve distribution per pt report       Assessment:  Response to therapy has been improvement to:  ROM of Wrist:  All Planes  Flexibility:  Nerve flexibility is WNL  Strength:   strength is near normal without pain  Pain:  frequency is less, intensity of pain is decreased, duration of pain is decreased and less tender over affected area    Overall Assessment:  Patient's symptoms are resolving.  Patient is becoming more independent in home exercise program  STG/LTG:  STGoals have been reviewed and progress or achievement has occurred;  see goal sheet for details and updates.  LTGoals have been reviewed and progress or achievement has occurred:  see goal sheet for details and updates.  I have re-evaluated this patient and find that the nature, scope, duration and intensity of the therapy is appropriate for the medical condition of the patient.          Treatment Plan: P:  Frequency/Duration:  Recommend continuing with the current treatment plan. Frequency:  1 X week, every other week, once daily  Duration:  for 8 weeks    Recommendations for Continued Therapy  Treatment Plan:    Additions to Treatment Plan -  Modalities:  US to lateral elbow  Therapeutic Exercise:  AROM, PROM, Isotonics and Isometrics  Neuromuscular re-education:  Nerve Gliding  Manual Techniques:  Myofascial release  Self Care:  Ergonomic Considerations      Discharge Plan:  Achieve all LTG  [place] in home treatment program.  Reach maximal therapeutic benefit.  Please refer to the daily flowsheet for treatment today and total treatment time.       Home Exercise Program:  Continue RN gliding and progress strengthening, minding and avoiding pain to reduce aggravation of RN sx

## 2021-10-24 ENCOUNTER — HEALTH MAINTENANCE LETTER (OUTPATIENT)
Age: 51
End: 2021-10-24

## 2022-02-13 ENCOUNTER — HEALTH MAINTENANCE LETTER (OUTPATIENT)
Age: 52
End: 2022-02-13

## 2022-03-10 ENCOUNTER — VIRTUAL VISIT (OUTPATIENT)
Dept: FAMILY MEDICINE | Facility: CLINIC | Age: 52
End: 2022-03-10
Payer: COMMERCIAL

## 2022-03-10 DIAGNOSIS — F41.9 ANXIETY: Primary | ICD-10-CM

## 2022-03-10 RX ORDER — LORAZEPAM 0.5 MG/1
0.5 TABLET ORAL EVERY 6 HOURS PRN
Qty: 20 TABLET | Refills: 0 | Status: SHIPPED | OUTPATIENT
Start: 2022-03-10 | End: 2022-09-27

## 2022-03-10 NOTE — PROGRESS NOTES
Shanna is a 52 year old who is being evaluated via a billable video visit.      How would you like to obtain your AVS? MyChart  If the video visit is dropped, the invitation should be resent by: Text to cell phone: [phone]  Will anyone else be joining your video visit? No      Video Start Time: 1:07 PM    Assessment & Plan   Problem List Items Addressed This Visit     None      Visit Diagnoses     Anxiety    -  Primary    Relevant Medications    LORazepam (ATIVAN) 0.5 MG tablet         Agreed to small Rx for lorazepam to address anxiety as noted. Considered alternatives such as propranolol or a rescue inhaler to address cardia or respiratory issues, but for now will focus on mood issues. We did discuss addictive potential of benzodiazepines and patient understands risks involved with this medication. Will monitor closely. If symptoms fail to improve as COVID clears would consider switching to less addictive alternative medication.     22 minutes spent on the date of the encounter doing chart review, history and exam, documentation and further activities as noted above.    Ramiro Dejesus MD  [facility]    Subjective   Shanna is a 52 year old who presents for the following health issues    HPI   Post COVID anxiety  - COVID symptom started last Thursday  - started with sore throat  - sneezing, cough, headache  - most symptoms have resolved at this point except as noted below  - still has a cough  - primary concern is for her associated anxiety  - she has struggled with anxiety in the past, most recently with the death of her father about a year ago, current physical sensation is very much the same with chest tightness and shortness of breath, which then triggers a greater sensation of anxiety which can become overwhelming  - she was given a small Rx for lorazepam after the passing of her father and says that was very helpful at the time  - today, she is able to speak in full sentences without becoming short  of breath  - able to perform ADL without limiting fatigue or dyspnea      Review of Systems   Constitutional, HEENT, cardiovascular, pulmonary, gi and gu systems are negative, except as otherwise noted.      Objective           Vitals:  No vitals were obtained today due to virtual visit.    Physical Exam   GENERAL: Healthy, alert and no distress  EYES: Eyes grossly normal to inspection.  No discharge or erythema, or obvious scleral/conjunctival abnormalities.  RESP: No audible wheeze, cough, or visible cyanosis.  No visible retractions or increased work of breathing.    SKIN: Visible skin clear. No significant rash, abnormal pigmentation or lesions.  NEURO: Cranial nerves grossly intact.  Mentation and speech appropriate for age.  PSYCH: Mentation appears normal, affect normal/bright, judgement and insight intact, normal speech and appearance well-groomed.        Video-Visit Details    Type of service:  Video Visit    Video End Time:1:22 PM    Originating Location (pt. Location): Home    Distant Location (provider location):  [facility]     Platform used for Video Visit: [employer]

## 2022-03-21 DIAGNOSIS — F32.89 MINOR DEPRESSIVE DISORDER: ICD-10-CM

## 2022-03-21 RX ORDER — BUPROPION HYDROCHLORIDE 300 MG/1
300 TABLET ORAL EVERY MORNING
Qty: 30 TABLET | Refills: 0 | Status: SHIPPED | OUTPATIENT
Start: 2022-03-21 | End: 2022-05-11

## 2022-03-21 NOTE — TELEPHONE ENCOUNTER
Medication requested: buPROPion (WELLBUTRIN XL) 300 MG 24 hr tablet  Last office visit: 3/9/21  [facility] appointments: none  Medication last refilled: 3/9/21; 90 + 3 refills  Last qualifying labs:   PHQ-9 / BORIS-7 Scores 8/2015 to present 3/9/2021   BORIS-7 Score     BORIS-7 Score MyChart    BORIS-7 Score DocFlow 0   PHQ-9 Score MyChart    PHQ-9 Score DocFlow 3     Medication is being filled for 1 time refill only due to:  Patient needs to be seen because mental health med management; updated PHQ 9.    Will notify pt via [employer] for need to schedule appt with Dr. Cadet.    Pasquale BURNS, RN  03/21/22 3:56 PM

## 2022-05-10 DIAGNOSIS — F32.89 MINOR DEPRESSIVE DISORDER: ICD-10-CM

## 2022-05-10 NOTE — TELEPHONE ENCOUNTER
Last office visit: 3/10/22   Next appointment: 6/23/22 - physical exam  Last time filled: 4/14/22  Medication is being filled for 1 time refill only due to:  Patient needs to be seen because for mental health visit.     Mery Carmona RN  [facility]

## 2022-05-11 RX ORDER — BUPROPION HYDROCHLORIDE 300 MG/1
300 TABLET ORAL EVERY MORNING
Qty: 30 TABLET | Refills: 1 | Status: SHIPPED | OUTPATIENT
Start: 2022-05-11 | End: 2022-06-23

## 2022-06-13 ENCOUNTER — TELEPHONE (OUTPATIENT)
Dept: FAMILY MEDICINE | Facility: CLINIC | Age: 52
End: 2022-06-13
Payer: COMMERCIAL

## 2022-06-13 NOTE — TELEPHONE ENCOUNTER
Who is calling? Patient   Reason for Call: Got a tick bite and would like to speak with RN

## 2022-06-13 NOTE — TELEPHONE ENCOUNTER
Pt noticed tick on body on Saturday night, near upper right thigh where leg meets gluteal fold. Pt's  was able to remove entire tick with small piece of skin still in tick's mouth. Small amount of bleeding after removal, but pt reports she washed it and covered with adhesive bandage. Pt does not report any s/s of flu-like illness of unusual rash development. Recommended that pt let us know how she is doing on Wednesday and if she has had any symptoms develop. Explained that pt can seek care through [facility] if symptoms develop after-hours. Pt confirms understanding and agreeable to this plan.    Pasquale Lo - GRANT, RN  06/13/22 3:18 PM

## 2022-06-22 NOTE — PATIENT INSTRUCTIONS
1200mg calcium  1000 international unit(s) daily    Preventive Health Recommendations  Female Ages 50 - 64    Yearly exam: See your health care provider every year in order to  Review health changes.   Discuss preventive care.    Review your medicines if your doctor has prescribed any.    Get a Pap test every three years (unless you have an abnormal result and your provider advises testing more often).  If you get Pap tests with HPV test, you only need to test every 5 years, unless you have an abnormal result.   You do not need a Pap test if your uterus was removed (hysterectomy) and you have not had cancer.  You should be tested each year for STDs (sexually transmitted diseases) if you're at risk.   Have a mammogram every 1 to 2 years.  Have a colonoscopy at age 50, or have a yearly FIT test (stool test). These exams screen for colon cancer.    Have a cholesterol test every 5 years, or more often if advised.  Have a diabetes test (fasting glucose) every three years. If you are at risk for diabetes, you should have this test more often.   If you are at risk for osteoporosis (brittle bone disease), think about having a bone density scan (DEXA).    Shots: Get a flu shot each year. Get a tetanus shot every 10 years.    Nutrition:   Eat at least 5 servings of fruits and vegetables each day.  Eat whole-grain bread, whole-wheat pasta and brown rice instead of white grains and rice.  Get adequate Calcium and Vitamin D.     Lifestyle  Exercise at least 150 minutes a week (30 minutes a day, 5 days a week). This will help you control your weight and prevent disease.  Limit alcohol to one drink per day.  No smoking.   Wear sunscreen to prevent skin cancer.   See your dentist every six months for an exam and cleaning.  See your eye doctor every 1 to 2 years.

## 2022-06-23 ENCOUNTER — OFFICE VISIT (OUTPATIENT)
Dept: FAMILY MEDICINE | Facility: CLINIC | Age: 52
End: 2022-06-23
Payer: COMMERCIAL

## 2022-06-23 VITALS
DIASTOLIC BLOOD PRESSURE: 75 MMHG | WEIGHT: 179.08 LBS | HEART RATE: 57 BPM | SYSTOLIC BLOOD PRESSURE: 126 MMHG | TEMPERATURE: 97.6 F | HEIGHT: 67 IN | OXYGEN SATURATION: 97 % | BODY MASS INDEX: 28.11 KG/M2

## 2022-06-23 DIAGNOSIS — Z00.00 ROUTINE GENERAL MEDICAL EXAMINATION AT A HEALTH CARE FACILITY: Primary | ICD-10-CM

## 2022-06-23 DIAGNOSIS — Z12.31 VISIT FOR SCREENING MAMMOGRAM: ICD-10-CM

## 2022-06-23 DIAGNOSIS — Z12.4 SCREENING FOR CERVICAL CANCER: ICD-10-CM

## 2022-06-23 DIAGNOSIS — F32.89 MINOR DEPRESSIVE DISORDER: ICD-10-CM

## 2022-06-23 DIAGNOSIS — Z11.59 NEED FOR HEPATITIS C SCREENING TEST: ICD-10-CM

## 2022-06-23 DIAGNOSIS — E78.5 HYPERLIPIDEMIA LDL GOAL <130: ICD-10-CM

## 2022-06-23 DIAGNOSIS — E03.9 ACQUIRED HYPOTHYROIDISM: ICD-10-CM

## 2022-06-23 DIAGNOSIS — Z11.4 SCREENING FOR HUMAN IMMUNODEFICIENCY VIRUS: ICD-10-CM

## 2022-06-23 LAB
CHOLEST SERPL-MCNC: 197 MG/DL
HDLC SERPL-MCNC: 89 MG/DL
LDLC SERPL CALC-MCNC: 70 MG/DL
NONHDLC SERPL-MCNC: 108 MG/DL
TRIGL SERPL-MCNC: 188 MG/DL
TSH SERPL DL<=0.005 MIU/L-ACNC: 0.81 UIU/ML (ref 0.3–4.2)

## 2022-06-23 PROCEDURE — 87624 HPV HI-RISK TYP POOLED RSLT: CPT | Performed by: INTERNAL MEDICINE

## 2022-06-23 PROCEDURE — 86803 HEPATITIS C AB TEST: CPT | Performed by: INTERNAL MEDICINE

## 2022-06-23 PROCEDURE — 84443 ASSAY THYROID STIM HORMONE: CPT | Performed by: INTERNAL MEDICINE

## 2022-06-23 PROCEDURE — 80061 LIPID PANEL: CPT | Performed by: INTERNAL MEDICINE

## 2022-06-23 PROCEDURE — 87389 HIV-1 AG W/HIV-1&-2 AB AG IA: CPT | Performed by: INTERNAL MEDICINE

## 2022-06-23 RX ORDER — LEVOTHYROXINE SODIUM 112 UG/1
112 TABLET ORAL DAILY
Qty: 90 TABLET | Refills: 3 | Status: SHIPPED | OUTPATIENT
Start: 2022-06-23 | End: 2023-06-22

## 2022-06-23 RX ORDER — ROSUVASTATIN CALCIUM 20 MG/1
20 TABLET, COATED ORAL DAILY
Qty: 90 TABLET | Refills: 3 | Status: SHIPPED | OUTPATIENT
Start: 2022-06-23 | End: 2023-07-06

## 2022-06-23 RX ORDER — BUPROPION HYDROCHLORIDE 300 MG/1
300 TABLET ORAL EVERY MORNING
Qty: 90 TABLET | Refills: 3 | Status: SHIPPED | OUTPATIENT
Start: 2022-06-23 | End: 2023-06-12

## 2022-06-23 ASSESSMENT — ANXIETY QUESTIONNAIRES
GAD7 TOTAL SCORE: 5
IF YOU CHECKED OFF ANY PROBLEMS ON THIS QUESTIONNAIRE, HOW DIFFICULT HAVE THESE PROBLEMS MADE IT FOR YOU TO DO YOUR WORK, TAKE CARE OF THINGS AT HOME, OR GET ALONG WITH OTHER PEOPLE: NOT DIFFICULT AT ALL
1. FEELING NERVOUS, ANXIOUS, OR ON EDGE: SEVERAL DAYS
3. WORRYING TOO MUCH ABOUT DIFFERENT THINGS: NOT AT ALL
6. BECOMING EASILY ANNOYED OR IRRITABLE: SEVERAL DAYS
2. NOT BEING ABLE TO STOP OR CONTROL WORRYING: SEVERAL DAYS
5. BEING SO RESTLESS THAT IT IS HARD TO SIT STILL: NOT AT ALL
7. FEELING AFRAID AS IF SOMETHING AWFUL MIGHT HAPPEN: SEVERAL DAYS
GAD7 TOTAL SCORE: 5

## 2022-06-23 ASSESSMENT — PATIENT HEALTH QUESTIONNAIRE - PHQ9
5. POOR APPETITE OR OVEREATING: SEVERAL DAYS
SUM OF ALL RESPONSES TO PHQ QUESTIONS 1-9: 3

## 2022-06-23 NOTE — NURSING NOTE
"52 year old  Chief Complaint   Patient presents with     Physical       Blood pressure 126/75, pulse 57, temperature 97.6  F (36.4  C), height 1.707 m (5' 7.21\"), weight 81.2 kg (179 lb 1.3 oz), last menstrual period 2021, SpO2 97 %, not currently breastfeeding. Body mass index is 27.88 kg/m .  Patient Active Problem List   Diagnosis     Allergic rhinitis due to pollen     Depressive disorder, not elsewhere classified     ISOLATED OR SPECIFIC PHOBIAS NEC- flying      Hyperlipidemia LDL goal <130     Hypothyroidism     Scoliosis     Menstrual irregularity     Non-celiac gluten sensitivity     Dairy product intolerance     Irritable bowel syndrome with both constipation and diarrhea       Wt Readings from Last 2 Encounters:   22 81.2 kg (179 lb 1.3 oz)   21 85.3 kg (188 lb)     BP Readings from Last 3 Encounters:   22 126/75   21 115/71   02/10/21 119/70         Current Outpatient Medications   Medication     B Complex Vitamins (B COMPLEX PO)     buPROPion (WELLBUTRIN XL) 300 MG 24 hr tablet     ibuprofen (ADVIL,MOTRIN) 600 MG tablet     levothyroxine (SYNTHROID/LEVOTHROID) 112 MCG tablet     LORazepam (ATIVAN) 0.5 MG tablet     LORazepam (ATIVAN) 0.5 MG tablet     Omega-3 Fatty Acids (FISH OIL PO)     rosuvastatin (CRESTOR) 20 MG tablet     No current facility-administered medications for this visit.       Social History     Tobacco Use     Smoking status: Former Smoker     Packs/day: 0.50     Years: 8.00     Pack years: 4.00     Quit date: 1999     Years since quittin.0     Smokeless tobacco: Never Used   Substance Use Topics     Alcohol use: Yes     Alcohol/week: 1.0 standard drink     Types: 1 Glasses of wine per week     Comment: 1 glass of wine with dinner     Drug use: No     Comment: no herbal meds either       Health Maintenance Due   Topic Date Due     ADVANCE CARE PLANNING  Never done     DEPRESSION ACTION PLAN  Never done     HIV SCREENING  Never done     HEPATITIS C " SCREENING  Never done     PAP  04/26/2020     ZOSTER IMMUNIZATION (1 of 2) Never done     PHQ-9  06/09/2021     COVID-19 Vaccine (4 - Booster for Moderna series) 02/28/2022     MAMMO SCREENING  06/04/2022       Lab Results   Component Value Date    PAP NIL 04/26/2017 June 23, 2022 1:51 PM

## 2022-06-23 NOTE — LETTER
My Depression Action Plan  Name: Shanna Kang   Date of Birth 1970  Date: 6/23/2022    My doctor: Daria Cadet   My clinic: [address]  [facility] [address]  [phone]          GREEN    ZONE   Good Control    What it looks like:     Things are going generally well. You have normal ups and downs. You may even feel depressed from time to time, but bad moods usually last less than a day.   What you need to do:  1. Continue to care for yourself (see self care plan)  2. Check your depression survival kit and update it as needed  3. Follow your physician s recommendations including any medication.  4. Do not stop taking medication unless you consult with your physician first.           YELLOW         ZONE Getting Worse    What it looks like:     Depression is starting to interfere with your life.     It may be hard to get out of bed; you may be starting to isolate yourself from others.    Symptoms of depression are starting to last most all day and this has happened for several days.     You may have suicidal thoughts but they are not constant.   What you need to do:     1. Call your care team. Your response to treatment will improve if you keep your care team informed of your progress. Yellow periods are signs an adjustment may need to be made.     2. Continue your self-care.  Just get dressed and ready for the day.  Don't give yourself time to talk yourself out of it.    3. Talk to someone in your support network.    4. Open up your Depression Self-Care Plan/Wellness Kit.           RED    ZONE Medical Alert - Get Help    What it looks like:     Depression is seriously interfering with your life.     You may experience these or other symptoms: You can t get out of bed most days, can t work or engage in other necessary activities, you have trouble taking care of basic hygiene, or basic responsibilities, thoughts of suicide or  death that will not go away, self-injurious behavior.     What you need to do:  1. Call your care team and request a same-day appointment. If they are not available (weekends or after hours) call your local crisis line, emergency room or 911.          Depression Self-Care Plan / Wellness Kit    Many people find that medication and therapy are helpful treatments for managing depression. In addition, making small changes to your everyday life can help to boost your mood and improve your wellbeing. Below are some tips for you to consider. Be sure to talk with your medical provider and/or behavioral health consultant if your symptoms are worsening or not improving.     Sleep   Sleep hygiene  means all of the habits that support good, restful sleep. It includes maintaining a consistent bedtime and wake time, using your bedroom only for sleeping or sex, and keeping the bedroom dark and free of distractions like a computer, smartphone, or television.     Develop a Healthy Routine  Maintain good hygiene. Get out of bed in the morning, make your bed, brush your teeth, take a shower, and get dressed. Don t spend too much time viewing media that makes you feel stressed. Find time to relax each day.    Exercise  Get some form of exercise every day. This will help reduce pain and release endorphins, the  feel good  chemicals in your brain. It can be as simple as just going for a walk or doing some gardening, anything that will get you moving.      Diet  Strive to eat healthy foods, including fruits and vegetables. Drink plenty of water. Avoid excessive sugar, caffeine, alcohol, and other mood-altering substances.     Stay Connected with Others  Stay in touch with friends and family members.    Manage Your Mood  Try deep breathing, massage therapy, biofeedback, or meditation. Take part in fun activities when you can. Try to find something to smile about each day.     Psychotherapy  Be open to working with a therapist if your  provider recommends it.     Medication  Be sure to take your medication as prescribed. Most anti-depressants need to be taken every day. It usually takes several weeks for medications to work. Not all medicines work for all people. It is important to follow-up with your provider to make sure you have a treatment plan that is working for you. Do not stop your medication abruptly without first discussing it with your provider.    Crisis Resources   These hotlines are for both adults and children. They and are open 24 hours a day, 7 days a week unless noted otherwise.      National Suicide Prevention Lifeline   4-907-135-AJHN (7705)      Crisis Text Line    www.crisistextline.org  Text HOME to 841612 from anywhere in the [place], anytime, about any type of crisis. A live, trained crisis counselor will receive the text and respond quickly.      [facility] Lifeline for LGBTQ Youth  A national crisis intervention and suicide lifeline for LGBTQ youth under 25. Provides a safe place to talk without judgement. Call 1-359.879.8628; text START to 157876 or visit www.thetrevorproject.org to talk to a trained counselor.      For [facility] crisis numbers, visit the [facility] website at:  https://[place].gov/dhs/people-we-serve/adults/health-care/mental-health/resources/crisis-contacts.jsp

## 2022-06-23 NOTE — PROGRESS NOTES
"Shanna Kang is a 51 yo woman with hx of hypothyroidism, scoliosis, IBS, depressive disorder. She is here for a preventive exam.  She is not fasting. She is up to date on eye exams and dental visits. Wears seat belt- yes. Bike helmet- yes.       HCM  Advanced Directive: none on file, given today  COVID vaccine series:  3/4 received  Other vaccinations Shingrix series is due  Pap due now  Mammogram: done 21, due now    Colonoscopy: due   HIV/ Hep C Screening due    Diet: gluten free, avoids dairy, eating less red meat, lots of vegetables. Has been following weight watchers to help lose weight.  Wt Readings from Last 4 Encounters:   22 81.2 kg (179 lb 1.3 oz)   21 85.3 kg (188 lb)   02/10/21 84 kg (185 lb 1.6 oz)   19 81.6 kg (179 lb 14.4 oz)     Body mass index is 27.88 kg/m .     Hypothyroidism  Currently taking levothyroxine 112 mcg daily, doing well. Consistent with dosing. Endorses occasional diarrhea and constipation. Gluten free diet and avoiding dairy has helped to improve her sx.    Minor Depressive Disorder/Anxiety  Hx of minor depression. Currently taking bupropion 300 mg daily. She had a case of COVID in 2022 and indicates it triggered anxiety. Reports that her anxiety levels at that time were similar to \"when my dad \". Lorazepam 0.5 mg \"saved me\" and helped her to manage anxiety.  Some current situational stress due to both of her college-aged children being home for the summer, but she is coping.     No current therapist. Concerned about the difficulty of finding a new therapist  Sleep: \"okay\"    PHQ 3/9/2021 3/9/2021 2022   PHQ-9 Total Score 3 3 3   Q9: Thoughts of better off dead/self-harm past 2 weeks Not at all Not at all Not at all     BORIS-7 SCORE 3/9/2021 3/9/2021 2022   Total Score - - -   Total Score 0 0 5     Hyperlipidemia  Currently taking rosuvastatin 20 mg, working her way up to a full dose. Often cut her pills in half while increasing her " dose. Felt achy, fatigued when she first started to increase her dose. Taken at bedtime only.  She is due for lipid profile.  Recent Labs   Lab Test 06/10/21  0753 03/02/21  0728 09/16/20  0832 04/28/17  0826 08/26/15  0755   CHOL 216* 251* 250.0*   < > 244*   HDL 99 98 90.0   < > 78   * 141* 147.0*   < > 152*   TRIG 50 65 64.0   < > 72   CHOLHDLRATIO  --   --  2.8  --  3.1    < > = values in this interval not displayed.     COVID Vaccination Questions  Shanna will be traveling this summer. Shanna plans to get her second booster 2 weeks before leaving. Does weekly PCR testing at her employer. Had clinical COVID infection in March. Asking when the best time would be to get the vaccine.     Health Maintenance   Topic Date Due     ADVANCE CARE PLANNING  Never done     DEPRESSION ACTION PLAN  Never done     HIV SCREENING  Never done     HEPATITIS C SCREENING  Never done     PAP  04/26/2020     ZOSTER IMMUNIZATION (1 of 2) Never done     COVID-19 Vaccine (4 - Booster for Moderna series) 02/28/2022     MAMMO SCREENING  06/04/2022     PHQ-9  09/23/2022     DTAP/TDAP/TD IMMUNIZATION (3 - Td or Tdap) 04/09/2023     PREVENTIVE CARE VISIT  06/23/2023     COLORECTAL CANCER SCREENING  11/09/2025     LIPID  06/10/2026     INFLUENZA VACCINE  Completed     Pneumococcal Vaccine: Pediatrics (0 to 5 Years) and At-Risk Patients (6 to 64 Years)  Aged Out     IPV IMMUNIZATION  Aged Out     MENINGITIS IMMUNIZATION  Aged Out     HEPATITIS B IMMUNIZATION  Aged Out     Patient Active Problem List   Diagnosis     Allergic rhinitis due to pollen     Depressive disorder, not elsewhere classified     ISOLATED OR SPECIFIC PHOBIAS NEC- flying      Hyperlipidemia LDL goal <130     Hypothyroidism     Scoliosis     Menstrual irregularity     Non-celiac gluten sensitivity     Dairy product intolerance     Irritable bowel syndrome with both constipation and diarrhea     Past Surgical History:   Procedure Laterality Date     BIOPSY OF SKIN LESION        COLONOSCOPY  2015    repeat in 10 yr     HC OPEN TREATMENT CLAVICULAR FRACTURE INTERNAL FX      after non-union- left, hardware placed then removed     ZZC  DELIVERY ONLY      , Low Cervical     ZZC SPINE FUSION,POSTER,13+ SGMTS      fusions x2 and removal of CD rods x 2 and one for infection , T3-L4/5     Family History   Problem Relation Age of Onset     Lipids Mother      Hypothyroidism Mother      Aortic aneurysm Father 65             Bipolar Disorder Brother      Lipids Maternal Grandmother      Lipids Maternal Grandfather      Heart Disease Maternal Grandfather         MI 60s,  in his 60, TOB     Lipids Maternal Aunt      Lipids Maternal Uncle      Lupus Paternal Aunt      Leukemia Paternal Aunt         AML     Breast Cancer Paternal Aunt         premenopause     Asthma No family hx of      C.A.D. 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  no  Vasomotor sx:  Yes, starting to improve  Vaginal dryness: sometimes with intercourse  G 2   P 2   A 0      Current Outpatient Medications   Medication Sig Dispense Refill     B Complex Vitamins (B COMPLEX PO)        buPROPion (WELLBUTRIN XL) 300 MG 24 hr tablet Take 1 tablet (300 mg) by mouth every morning 30 tablet 1     ibuprofen (ADVIL,MOTRIN) 600 MG tablet Take 1 tablet by mouth 3 times daily as needed.       levothyroxine (SYNTHROID/LEVOTHROID) 112 MCG tablet Take 1 tablet (112 mcg) by mouth daily 90 tablet 3     LORazepam (ATIVAN) 0.5 MG tablet Take 1 tablet (0.5 mg) by mouth every 6 hours as needed for anxiety 20 tablet 0     LORazepam (ATIVAN) 0.5 MG tablet Take 30 minutes prior to departure.  Do not  "operate a vehicle after taking this medication 10 tablet 0     Omega-3 Fatty Acids (FISH OIL PO)        rosuvastatin (CRESTOR) 20 MG tablet Take 1 tablet (20 mg) by mouth daily 90 tablet 3     Allergies   Allergen Reactions     Nkda [No Known Drug Allergies]          ROS  CONSTITUTIONAL:NEGATIVE for fever, chills, change in weight  INTEGUMENTARY/SKIN: NEGATIVE for worrisome rashes, moles or lesions  EYES: NEGATIVE for vision changes or irritation  ENT/MOUTH: NEGATIVE for ear, mouth and throat problems  RESP:NEGATIVE for significant cough or SOB  BREAST: NEGATIVE for masses, tenderness or discharge  CV: NEGATIVE for chest pain, palpitations, BOLAND, orthopnea, PND  or peripheral edema  GI: NEGATIVE for nausea, abdominal pain, heartburn, or change in bowel habits  :NEGATIVE for frequency, dysuria, or hematuria  MUSCULOSKELETAL:NEGATIVE for significant arthralgias or myalgia  NEURO: NEGATIVE for weakness, dizziness or paresthesias  ENDOCRINE: NEGATIVE for polyuria/dipsia,  temperature intolerance, skin/hair changes  HEME/ALLERGY/IMMUNE: NEGATIVE for bleeding problems  PSYCHIATRIC: see above, stable    EXAM  /75   Pulse 57   Temp 97.6  F (36.4  C)   Ht 1.707 m (5' 7.21\")   Wt 81.2 kg (179 lb 1.3 oz)   LMP 01/01/2021   SpO2 97%   Breastfeeding No   BMI 27.88 kg/m      GENERAL APPEARANCE: Alert, pleasant, NAD  EYES: PERRL, EOMI, conjunctiva clear  HENT: TM normal bilaterally. Nose and mouth masked  NECK: no adenopathy, thyroid normal to palpation   RESP: lungs clear to auscultation bilaterally  BREAST: normal without masses, no tenderness or nipple discharge and no palpable  axillary masses or adenopathy   CV: regular rate and rhythm, normal S1 S2, no murmur, no carotid bruits  ABDOMEN: soft, nontender, without HSM or masses. Bowel sounds normal  : External genitalia without lesions, cervix normal, pap easily obtained, no abnormal discharge, bimanual exam without adnexal masses or tenderness, uterus non " enlarged  MS: extremities normal- no gross deformities noted, no tender, hot or swollen joints.    SKIN: no suspicious lesions or rashes  NEURO: Normal strength and tone, sensory exam grossly normal, DTR normoreflexive in upper and lower extremities  PSYCH: mentation appears normal. and affect normal/bright.  EXT: no peripheral edema, pedal pulses palpable    Assessment:  (Z00.00) Routine general medical examination at a health care facility  (primary encounter diagnosis)  Comment: 52 year old female in good health.  Plan: Today we discussed ways to maintain a healthy lifestyle with sensible eating, regular exercise and self cares. We dicussed calcium and Vitamin D intake, vaccinations and preventive health screens.   Recommend Shingrix vaccine series. Recommend COVID booster now.    (Z12.31) Visit for screening mammogram  Comment: Due for routine screening. Normal exam  Plan: Mammogram - routine screening    (Z12.4) Screening for cervical cancer  Comment: Due for routine screening.  Plan: PAP screen with HPV - recommended age 30 - 65         years        Completed today.    (F32.9) Minor depressive disorder  Comment: Doing well on current medication. Had a significant increase in anxiety when she had COVID and March, but says that lorazepam was very helpful in managing her anxiety during that time. No current therapist.  Plan: DEPRESSION ACTION PLAN (DAP), buPROPion         (WELLBUTRIN XL) 300 MG 24 hr tablet        Refilled medication, no change in dose.  She will contact me if she wishes to have referral placed for therapist.     (E03.9) Acquired hypothyroidism  Comment: Doing well on current medication. Due for repeat TSH.   TSH in perfect range  TSH   Date Value Ref Range Status   06/23/2022 0.81 0.30 - 4.20 uIU/mL Final   06/10/2021 2.44 0.40 - 4.00 mU/L Final   Plan: levothyroxine (SYNTHROID/LEVOTHROID) 112 MCG         tablet, TSH with free T4 reflex        Refilled levothyroxine 112mcg daily, for one  year    (E78.5) Hyperlipidemia LDL goal <130  Comment: has been working her way up to a full dose of rosuvastatin with some side effects, but doing well overall. Not fasting. Numbers looking much better, in target range  Recent Labs   Lab Test 06/23/22  1438 06/10/21  0753 03/02/21  0728 09/16/20  0832 04/28/17  0826 08/26/15  0755   CHOL 197 216*   < > 250.0*   < > 244*   HDL 89 99   < > 90.0   < > 78   LDL 70 107*   < > 147.0*   < > 152*   TRIG 188* 50   < > 64.0   < > 72   CHOLHDLRATIO  --   --   --  2.8  --  3.1    < > = values in this interval not displayed.   Plan: Lipid Profile, rosuvastatin (CRESTOR) 20 MG         tablet       Continue rosuvastatin 20mg daily    (Z11.4) Screening for human immunodeficiency virus  Comment: Shanna elects to complete screening. Low risk  Plan: HIV Antigen Antibody Combo    (Z11.59) Need for hepatitis C screening test  Comment: Shanna elects to complete screening today.,low risk  Plan: Hepatitis C Screen Reflex to HCV RNA Quant and         Genotype    Daria Cadet MD  Internal Medicine/Pediatrics      I, Janeth Dixon, am serving as a scribe to document services personally performed by Dr. Daria Cadet, based on data collection and the provider's statements to me. Dr. Cadet has reviewed, edited, and approved the above note.

## 2022-06-24 LAB
HCV AB SERPL QL IA: NONREACTIVE
HIV 1+2 AB+HIV1 P24 AG SERPL QL IA: NONREACTIVE

## 2022-06-28 LAB
BKR LAB AP GYN ADEQUACY: NORMAL
BKR LAB AP GYN INTERPRETATION: NORMAL
BKR LAB AP HPV REFLEX: NORMAL
BKR LAB AP LMP: NORMAL
BKR LAB AP PREVIOUS ABNORMAL: NORMAL
PATH REPORT.COMMENTS IMP SPEC: NORMAL
PATH REPORT.COMMENTS IMP SPEC: NORMAL
PATH REPORT.RELEVANT HX SPEC: NORMAL

## 2022-06-30 LAB
HUMAN PAPILLOMA VIRUS 16 DNA: NEGATIVE
HUMAN PAPILLOMA VIRUS 18 DNA: NEGATIVE
HUMAN PAPILLOMA VIRUS FINAL DIAGNOSIS: NORMAL
HUMAN PAPILLOMA VIRUS OTHER HR: NEGATIVE

## 2022-07-04 RX ORDER — IBUPROFEN 200 MG
1 CAPSULE ORAL
Qty: 90 TABLET | Refills: 3 | COMMUNITY
Start: 2022-07-04

## 2022-07-04 RX ORDER — VITAMIN B COMPLEX
1 TABLET ORAL DAILY
Qty: 90 TABLET | Refills: 3 | COMMUNITY
Start: 2022-07-04

## 2022-07-06 ENCOUNTER — ANCILLARY PROCEDURE (OUTPATIENT)
Dept: MAMMOGRAPHY | Facility: CLINIC | Age: 52
End: 2022-07-06
Attending: INTERNAL MEDICINE
Payer: COMMERCIAL

## 2022-07-06 DIAGNOSIS — Z12.31 VISIT FOR SCREENING MAMMOGRAM: ICD-10-CM

## 2022-07-06 PROCEDURE — 77067 SCR MAMMO BI INCL CAD: CPT | Performed by: RADIOLOGY

## 2022-07-06 PROCEDURE — 77063 BREAST TOMOSYNTHESIS BI: CPT | Performed by: RADIOLOGY

## 2022-09-27 DIAGNOSIS — F41.9 ANXIETY: ICD-10-CM

## 2022-09-27 RX ORDER — LORAZEPAM 0.5 MG/1
0.5 TABLET ORAL EVERY 6 HOURS PRN
Qty: 20 TABLET | Refills: 0 | Status: SHIPPED | OUTPATIENT
Start: 2022-09-27 | End: 2024-03-12

## 2023-03-13 ENCOUNTER — OFFICE VISIT (OUTPATIENT)
Dept: DERMATOLOGY | Facility: CLINIC | Age: 53
End: 2023-03-13
Payer: COMMERCIAL

## 2023-03-13 DIAGNOSIS — D22.9 MULTIPLE BENIGN NEVI: ICD-10-CM

## 2023-03-13 DIAGNOSIS — L81.4 SOLAR LENTIGO: ICD-10-CM

## 2023-03-13 DIAGNOSIS — Z12.83 SCREENING EXAM FOR SKIN CANCER: Primary | ICD-10-CM

## 2023-03-13 PROCEDURE — 99213 OFFICE O/P EST LOW 20 MIN: CPT | Mod: GC | Performed by: DERMATOLOGY

## 2023-03-13 ASSESSMENT — PAIN SCALES - GENERAL: PAINLEVEL: NO PAIN (0)

## 2023-03-13 NOTE — LETTER
3/13/2023       RE: Shanna Kang  [address] [address]     Dear Colleague,    Thank you for referring your patient, Shanna Kang, to the [facility] [place] at [facility]. Please see a copy of my visit note below.    [facility] Dermatology Note  Encounter Date: Mar 13, 2023  Office Visit     Dermatology Problem List:  1. Multiple benign nevi.   2. SKs, solar lentigos, cherry angiomas  3. AK of the right nasal dorsum s/p LN2 on 5/3/21  3. History of indoor tanning.   ____________________________________________    Assessment & Plan:     # Multiple clinically benign nevi  - Discussed benign etiology of these lesions and reassurance provided  - ABCDE's of melanoma reviewed with patient  - Encouraged daily sun protection with SPF 30+ and UPF clothing    # Solar lentigines  Discussed that these are benign pigmented macules due to ultraviolet radiation exposure, usually from the sun. These are commonly found on fair-skinned individuals. Sun protection with SPF 30+, UPF clothing, seeking shade and tanning bed avoidance is important to avoid development of new lesions. Patient informed to notify us of any changes to these lesions.     Procedures Performed:   None    Follow-up: 1 year(s) in-person, or earlier for new or changing lesions    Staff and Resident:     Myra Diggs DO (PGY-4)  Dermatology Resident  [facility]    Staff: Dr. Gwyn ZAPIEN, Yaa Pascal MD, saw this patient with the resident and agree with the resident s findings and plan of care as documented in the resident s note.    ____________________________________________    CC: Skin Check (No areas of concern)    HPI:  Ms. Shanna Kang is a(n) 53 year old female who presents today as a return patient for a skin check. She has no lesions of concern today. No family history of melanoma. No personal history of  NMSC but did have lots of sun exposure as a child and history of indoor tanning.     Patient is otherwise feeling well, without additional skin concerns.    Labs Reviewed:  N/A    Physical Exam:  Vitals: LMP 01/01/2021   SKIN: Full skin, which includes the head/face, both arms, chest, back, abdomen,both legs, genitalia and/or groin buttocks, digits and/or nails, was examined.  - Scar from prior biopsy on the central upper abdomen  - Multiple regular brown pigmented macules and papules are identified on the back, abdomen, arms, legs which have a reticular pattern throughout.   - Scattered brown macules on sun exposed areas.  - No other lesions of concern on areas examined.     Medications:  Current Outpatient Medications   Medication     B Complex Vitamins (B COMPLEX PO)     buPROPion (WELLBUTRIN XL) 300 MG 24 hr tablet     calcium 600 MG tablet     ibuprofen (ADVIL,MOTRIN) 600 MG tablet     levothyroxine (SYNTHROID/LEVOTHROID) 112 MCG tablet     LORazepam (ATIVAN) 0.5 MG tablet     LORazepam (ATIVAN) 0.5 MG tablet     Omega-3 Fatty Acids (FISH OIL PO)     rosuvastatin (CRESTOR) 20 MG tablet     Vitamin D3 (CHOLECALCIFEROL) 25 mcg (1000 units) tablet     No current facility-administered medications for this visit.      Past Medical History:   Patient Active Problem List   Diagnosis     Allergic rhinitis due to pollen     Depressive disorder, not elsewhere classified     ISOLATED OR SPECIFIC PHOBIAS NEC- flying      Hyperlipidemia LDL goal <130     Hypothyroidism     Scoliosis     Menstrual irregularity     Non-celiac gluten sensitivity     Dairy product intolerance     Irritable bowel syndrome with both constipation and diarrhea     Past Medical History:   Diagnosis Date     Constipation      Depressive disorder, not elsewhere classified     was on prozac in past, now on Wellbutrin     Hypothyroid      IBS (irritable bowel syndrome)     Better off gluten and dairy     Infectious mononucleosis 2001     Other  kyphoscoliosis and scoliosis     see surgical history - multiple surgeries     Pure hypercholesterolemia        CC Referred Self, MD  No address on file on close of this encounter.

## 2023-03-13 NOTE — NURSING NOTE
Chief Complaint   Patient presents with     Skin Check     No areas of concern     Moises Godoy, EMT

## 2023-03-13 NOTE — PROGRESS NOTES
[facility] Dermatology Note  Encounter Date: Mar 13, 2023  Office Visit     Dermatology Problem List:  1. Multiple benign nevi.   2. SKs, solar lentigos, cherry angiomas  3. AK of the right nasal dorsum s/p LN2 on 5/3/21  3. History of indoor tanning.   ____________________________________________    Assessment & Plan:     # Multiple clinically benign nevi  - Discussed benign etiology of these lesions and reassurance provided  - ABCDE's of melanoma reviewed with patient  - Encouraged daily sun protection with SPF 30+ and UPF clothing    # Solar lentigines  Discussed that these are benign pigmented macules due to ultraviolet radiation exposure, usually from the sun. These are commonly found on fair-skinned individuals. Sun protection with SPF 30+, UPF clothing, seeking shade and tanning bed avoidance is important to avoid development of new lesions. Patient informed to notify us of any changes to these lesions.     Procedures Performed:   None    Follow-up: 1 year(s) in-person, or earlier for new or changing lesions    Staff and Resident:     Myra Diggs DO (PGY-4)  Dermatology Resident  [facility]    Staff: Dr. Gwyn ZAPIEN, Yaa Pascal MD, saw this patient with the resident and agree with the resident s findings and plan of care as documented in the resident s note.    ____________________________________________    CC: Skin Check (No areas of concern)    HPI:  Ms. Shanna Kang is a(n) 53 year old female who presents today as a return patient for a skin check. She has no lesions of concern today. No family history of melanoma. No personal history of NMSC but did have lots of sun exposure as a child and history of indoor tanning.     Patient is otherwise feeling well, without additional skin concerns.    Labs Reviewed:  N/A    Physical Exam:  Vitals: LMP 01/01/2021   SKIN: Full skin, which includes the head/face, both arms, chest, back, abdomen,both legs, genitalia  and/or groin buttocks, digits and/or nails, was examined.  - Scar from prior biopsy on the central upper abdomen  - Multiple regular brown pigmented macules and papules are identified on the back, abdomen, arms, legs which have a reticular pattern throughout.   - Scattered brown macules on sun exposed areas.  - No other lesions of concern on areas examined.     Medications:  Current Outpatient Medications   Medication     B Complex Vitamins (B COMPLEX PO)     buPROPion (WELLBUTRIN XL) 300 MG 24 hr tablet     calcium 600 MG tablet     ibuprofen (ADVIL,MOTRIN) 600 MG tablet     levothyroxine (SYNTHROID/LEVOTHROID) 112 MCG tablet     LORazepam (ATIVAN) 0.5 MG tablet     LORazepam (ATIVAN) 0.5 MG tablet     Omega-3 Fatty Acids (FISH OIL PO)     rosuvastatin (CRESTOR) 20 MG tablet     Vitamin D3 (CHOLECALCIFEROL) 25 mcg (1000 units) tablet     No current facility-administered medications for this visit.      Past Medical History:   Patient Active Problem List   Diagnosis     Allergic rhinitis due to pollen     Depressive disorder, not elsewhere classified     ISOLATED OR SPECIFIC PHOBIAS NEC- flying      Hyperlipidemia LDL goal <130     Hypothyroidism     Scoliosis     Menstrual irregularity     Non-celiac gluten sensitivity     Dairy product intolerance     Irritable bowel syndrome with both constipation and diarrhea     Past Medical History:   Diagnosis Date     Constipation      Depressive disorder, not elsewhere classified     was on prozac in past, now on Wellbutrin     Hypothyroid      IBS (irritable bowel syndrome)     Better off gluten and dairy     Infectious mononucleosis 2001     Other kyphoscoliosis and scoliosis     see surgical history - multiple surgeries     Pure hypercholesterolemia        CC Referred Self, MD  No address on file on close of this encounter.

## 2023-03-13 NOTE — PATIENT INSTRUCTIONS
Patient Education     Checking for Skin Cancer  You can find cancer early by checking your skin each month. There are 3 kinds of skin cancer. They are melanoma, basal cell carcinoma, and squamous cell carcinoma. Doing monthly skin checks is the best way to find new marks or skin changes. Follow the instructions below for checking your skin.   The ABCDEs of checking moles for melanoma   Check your moles or growths for signs of melanoma using ABCDE:   Asymmetry: the sides of the mole or growth don t match  Border: the edges are ragged, notched, or blurred  Color: the color within the mole or growth varies  Diameter: the mole or growth is larger than 6 mm (size of a pencil eraser)  Evolving: the size, shape, or color of the mole or growth is changing (evolving is not shown in the images below)    Checking for other types of skin cancer  Basal cell carcinoma or squamous cell carcinoma have symptoms such as:     A spot or mole that looks different from all other marks on your skin  Changes in how an area feels, such as itching, tenderness, or pain  Changes in the skin's surface, such as oozing, bleeding, or scaliness  A sore that does not heal  New swelling or redness beyond the border of a mole    Who s at risk?  Anyone can get skin cancer. But you are at greater risk if you have:   Fair skin, light-colored hair, or light-colored eyes  Many moles or abnormal moles on your skin  A history of sunburns from sunlight or tanning beds  A family history of skin cancer  A history of exposure to radiation or chemicals  A weakened immune system  If you have had skin cancer in the past, you are at risk for recurring skin cancer.   How to check your skin  Do your monthly skin checkups in front of a full-length mirror. Check all parts of your body, including your:   Head (ears, face, neck, and scalp)  Torso (front, back, and sides)  Arms (tops, undersides, upper, and lower armpits)  Hands (palms, backs, and fingers, including  under the nails)  Buttocks and genitals  Legs (front, back, and sides)  Feet (tops, soles, toes, including under the nails, and between toes)  If you have a lot of moles, take digital photos of them each month. Make sure to take photos both up close and from a distance. These can help you see if any moles change over time.   Most skin changes are not cancer. But if you see any changes in your skin, call your doctor right away. Only he or she can diagnose a problem. If you have skin cancer, seeing your doctor can be the first step toward getting the treatment that could save your life.   [employer] last reviewed this educational content on 4/1/2019 2000-2020 [employer]. [address] [place] [address], [place] [address]. All rights reserved. This information is not intended as a substitute for professional medical care. Always follow your healthcare professional's instructions.       When should I call my doctor?  If you are worsening or not improving, please, contact us or seek urgent care as noted below.     Who should I call with questions (adults)?  [facility] (adult and pediatric): [phone]  [facility] (adult): [phone]  For urgent needs outside of business hours call the [facility] at [phone] and ask for the dermatology resident on call to be paged  If this is a medical emergency and you are unable to reach an ER, Call [phone]    Who should I call with questions (pediatric)?  [facility]- Pediatric Dermatology  Dr. Sarahi Armando, Dr. Trip Bermudez, Dr. Margi Peterson, RICARDO Colindres, Dr. Lazara Tavarez, Dr. Jessica Loera & Dr. Conrado Peguero  Non-urgent nurse triage line; [phone]- Magda and Ania HOYT Care Coordinatoradryan Keita (/Complex ) [phone]    If you need a prescription refill, please contact your pharmacy. Refills are approved or denied by our  Physicians during normal business hours, Monday through Fridays  Per office policy, refills will not be granted if you have not been seen within the past year (or sooner depending on your child's condition)    Scheduling Information:  Pediatric Appointment Scheduling and Call Center [phone]  Radiology Scheduling- [phone]  Sedation Unit Scheduling- [phone]  [facility] Scheduling- General [phone]; Pediatric Dermatology [phone]  Main  Services: [phone]  Setswana: [phone]  Croatian: [phone]  Hmong/Cambodian/Belarusian: [phone]  Preadmission Nursing Department Fax Number: [phone] (Fax all pre-operative paperwork to this number)    For urgent matters arising during evenings, weekends, or holidays that cannot wait for normal business hours please call [phone] and ask for the dermatology resident on call to be paged.

## 2023-03-21 NOTE — TELEPHONE ENCOUNTER
Last time prescribed: 3/9/21 , 90 tabs/caps x 3 refills  Last office visit: 3/10/22  Next appointment: No Future Appointment Scheduled       Protopic Pregnancy And Lactation Text: This medication is Pregnancy Category C. It is unknown if this medication is excreted in breast milk when applied topically.

## 2023-06-12 DIAGNOSIS — F32.89 MINOR DEPRESSIVE DISORDER: ICD-10-CM

## 2023-06-12 RX ORDER — BUPROPION HYDROCHLORIDE 300 MG/1
300 TABLET ORAL EVERY MORNING
Qty: 30 TABLET | Refills: 0 | Status: SHIPPED | OUTPATIENT
Start: 2023-06-12 | End: 2023-07-06

## 2023-06-12 NOTE — TELEPHONE ENCOUNTER
Medication requested: buPROPion (WELLBUTRIN XL) 300 MG 24 hr tablet  Last office visit: 6/23/22  [facility] appointments: 7/6/23  Medication last refilled: 6/23/22; 90 + 3 refills  Last qualifying labs:    6/23/2022  1:54 PM   PHQ-9 / BORIS-7 Scores 8/2015 to present    BORIS-7 Score DocFlow 5    PHQ-9 Score DocFlow 3      Prescription approved per [facility] Refill Protocol.    Pasquale BURNS, RN  06/12/23 12:06 PM

## 2023-06-14 ENCOUNTER — MYC MEDICAL ADVICE (OUTPATIENT)
Dept: DERMATOLOGY | Facility: CLINIC | Age: 53
End: 2023-06-14
Payer: COMMERCIAL

## 2023-06-15 NOTE — TELEPHONE ENCOUNTER
Yaa Pascal MD  [facility] D[facility]at[facility] Adult [facility] 50 minutes ago (8:27 AM)     KB  Yes, let's try to get the patient in with Dr LIDIA Francisco      You  Yaa Pascal MD 1 hour ago (7:58 AM)     IGNACIO  Should we try to get pt in? We could try to get the pt in with Dr. Francisco.

## 2023-06-22 DIAGNOSIS — E03.9 ACQUIRED HYPOTHYROIDISM: ICD-10-CM

## 2023-06-22 RX ORDER — LEVOTHYROXINE SODIUM 112 UG/1
112 TABLET ORAL DAILY
Qty: 30 TABLET | Refills: 0 | Status: SHIPPED | OUTPATIENT
Start: 2023-06-22 | End: 2023-07-06

## 2023-06-22 NOTE — TELEPHONE ENCOUNTER
Levothyroxine (Synthroid) 112 mcg    Last Office Visit: 6/23/22  [facility] Appointments: 7/6/23  Medication last refilled: 6/23/22 #90 with 3 refill(s)    Required labs per protocol:    LAB REF RANGE 6/10/21 6/23/22   TSH 0.40-4.0 mU/L 2.44 0.81     Medication is being filled for 1 time refill only due to:  Patient needs labs TSH. Has physical scheduled on 7/6/23.    Melissa Hyman, PETERN, RN, CCM

## 2023-07-06 ENCOUNTER — OFFICE VISIT (OUTPATIENT)
Dept: DERMATOLOGY | Facility: CLINIC | Age: 53
End: 2023-07-06
Payer: COMMERCIAL

## 2023-07-06 ENCOUNTER — OFFICE VISIT (OUTPATIENT)
Dept: FAMILY MEDICINE | Facility: CLINIC | Age: 53
End: 2023-07-06
Payer: COMMERCIAL

## 2023-07-06 VITALS
RESPIRATION RATE: 15 BRPM | TEMPERATURE: 97.3 F | OXYGEN SATURATION: 99 % | DIASTOLIC BLOOD PRESSURE: 76 MMHG | BODY MASS INDEX: 29.82 KG/M2 | HEIGHT: 67 IN | WEIGHT: 190 LBS | SYSTOLIC BLOOD PRESSURE: 124 MMHG | HEART RATE: 55 BPM

## 2023-07-06 DIAGNOSIS — E78.5 HYPERLIPIDEMIA LDL GOAL <130: ICD-10-CM

## 2023-07-06 DIAGNOSIS — Z00.00 ROUTINE GENERAL MEDICAL EXAMINATION AT A HEALTH CARE FACILITY: Primary | ICD-10-CM

## 2023-07-06 DIAGNOSIS — Z12.31 ENCOUNTER FOR SCREENING MAMMOGRAM FOR BREAST CANCER: ICD-10-CM

## 2023-07-06 DIAGNOSIS — E03.9 ACQUIRED HYPOTHYROIDISM: ICD-10-CM

## 2023-07-06 DIAGNOSIS — L81.4 LENTIGINES: Primary | ICD-10-CM

## 2023-07-06 DIAGNOSIS — B00.1 COLD SORE: ICD-10-CM

## 2023-07-06 DIAGNOSIS — F32.89 MINOR DEPRESSIVE DISORDER: ICD-10-CM

## 2023-07-06 DIAGNOSIS — N95.1 SYMPTOMATIC MENOPAUSAL OR FEMALE CLIMACTERIC STATES: ICD-10-CM

## 2023-07-06 DIAGNOSIS — L57.8 ACTINIC SKIN DAMAGE: ICD-10-CM

## 2023-07-06 LAB
CHOLEST SERPL-MCNC: 211 MG/DL
HDLC SERPL-MCNC: 95 MG/DL
LDLC SERPL CALC-MCNC: 102 MG/DL
NONHDLC SERPL-MCNC: 116 MG/DL
TRIGL SERPL-MCNC: 72 MG/DL
TSH SERPL DL<=0.005 MIU/L-ACNC: 1.56 UIU/ML (ref 0.3–4.2)

## 2023-07-06 PROCEDURE — 80061 LIPID PANEL: CPT | Mod: ORL | Performed by: INTERNAL MEDICINE

## 2023-07-06 PROCEDURE — 99213 OFFICE O/P EST LOW 20 MIN: CPT | Performed by: STUDENT IN AN ORGANIZED HEALTH CARE EDUCATION/TRAINING PROGRAM

## 2023-07-06 PROCEDURE — 84443 ASSAY THYROID STIM HORMONE: CPT | Mod: ORL | Performed by: INTERNAL MEDICINE

## 2023-07-06 RX ORDER — NORETHINDRONE ACETATE AND ETHINYL ESTRADIOL 1; 5 MG/1; UG/1
1 TABLET ORAL DAILY
Qty: 90 TABLET | Refills: 3 | Status: SHIPPED | OUTPATIENT
Start: 2023-07-06 | End: 2024-07-14

## 2023-07-06 RX ORDER — VALACYCLOVIR HYDROCHLORIDE 1 G/1
TABLET, FILM COATED ORAL
Qty: 8 TABLET | Refills: 1 | Status: SHIPPED | OUTPATIENT
Start: 2023-07-06 | End: 2024-07-14

## 2023-07-06 RX ORDER — ROSUVASTATIN CALCIUM 20 MG/1
20 TABLET, COATED ORAL DAILY
Qty: 90 TABLET | Refills: 3 | Status: SHIPPED | OUTPATIENT
Start: 2023-07-06 | End: 2024-07-14

## 2023-07-06 RX ORDER — BUPROPION HYDROCHLORIDE 300 MG/1
300 TABLET ORAL EVERY MORNING
Qty: 90 TABLET | Refills: 3 | Status: SHIPPED | OUTPATIENT
Start: 2023-07-06 | End: 2024-07-14

## 2023-07-06 RX ORDER — LEVOTHYROXINE SODIUM 112 UG/1
112 TABLET ORAL DAILY
Qty: 90 TABLET | Refills: 3 | Status: SHIPPED | OUTPATIENT
Start: 2023-07-06 | End: 2024-07-14

## 2023-07-06 RX ORDER — SERTRALINE HYDROCHLORIDE 25 MG/1
25 TABLET, FILM COATED ORAL DAILY
Qty: 90 TABLET | Refills: 1 | COMMUNITY
Start: 2023-07-06 | End: 2023-09-15

## 2023-07-06 ASSESSMENT — ANXIETY QUESTIONNAIRES
2. NOT BEING ABLE TO STOP OR CONTROL WORRYING: NOT AT ALL
7. FEELING AFRAID AS IF SOMETHING AWFUL MIGHT HAPPEN: NOT AT ALL
1. FEELING NERVOUS, ANXIOUS, OR ON EDGE: SEVERAL DAYS
6. BECOMING EASILY ANNOYED OR IRRITABLE: NOT AT ALL
GAD7 TOTAL SCORE: 2
3. WORRYING TOO MUCH ABOUT DIFFERENT THINGS: SEVERAL DAYS
5. BEING SO RESTLESS THAT IT IS HARD TO SIT STILL: NOT AT ALL
IF YOU CHECKED OFF ANY PROBLEMS ON THIS QUESTIONNAIRE, HOW DIFFICULT HAVE THESE PROBLEMS MADE IT FOR YOU TO DO YOUR WORK, TAKE CARE OF THINGS AT HOME, OR GET ALONG WITH OTHER PEOPLE: NOT DIFFICULT AT ALL
GAD7 TOTAL SCORE: 2

## 2023-07-06 ASSESSMENT — PAIN SCALES - GENERAL: PAINLEVEL: NO PAIN (0)

## 2023-07-06 ASSESSMENT — PATIENT HEALTH QUESTIONNAIRE - PHQ9
5. POOR APPETITE OR OVEREATING: NOT AT ALL
SUM OF ALL RESPONSES TO PHQ QUESTIONS 1-9: 3

## 2023-07-06 NOTE — NURSING NOTE
"53 year old  Chief Complaint   Patient presents with     Physical       Blood pressure 124/76, pulse 55, temperature 97.3  F (36.3  C), temperature source Skin, resp. rate 15, height 1.702 m (5' 7\"), weight 86.2 kg (190 lb), last menstrual period 2021, SpO2 99 %, not currently breastfeeding. Body mass index is 29.76 kg/m .  Patient Active Problem List   Diagnosis     Allergic rhinitis due to pollen     Depressive disorder, not elsewhere classified     ISOLATED OR SPECIFIC PHOBIAS NEC- flying      Hyperlipidemia LDL goal <130     Hypothyroidism     Scoliosis     Menstrual irregularity     Non-celiac gluten sensitivity     Dairy product intolerance     Irritable bowel syndrome with both constipation and diarrhea       Wt Readings from Last 2 Encounters:   23 86.2 kg (190 lb)   22 81.2 kg (179 lb 1.3 oz)     BP Readings from Last 3 Encounters:   23 124/76   22 126/75   21 115/71         Current Outpatient Medications   Medication     B Complex Vitamins (B COMPLEX PO)     buPROPion (WELLBUTRIN XL) 300 MG 24 hr tablet     calcium 600 MG tablet     ibuprofen (ADVIL,MOTRIN) 600 MG tablet     levothyroxine (SYNTHROID/LEVOTHROID) 112 MCG tablet     Omega-3 Fatty Acids (FISH OIL PO)     rosuvastatin (CRESTOR) 20 MG tablet     sertraline (ZOLOFT) 50 MG tablet     Vitamin D3 (CHOLECALCIFEROL) 25 mcg (1000 units) tablet     LORazepam (ATIVAN) 0.5 MG tablet     LORazepam (ATIVAN) 0.5 MG tablet     No current facility-administered medications for this visit.       Social History     Tobacco Use     Smoking status: Former     Packs/day: 0.50     Years: 8.00     Pack years: 4.00     Types: Cigarettes     Quit date: 1999     Years since quittin.1     Smokeless tobacco: Never   Vaping Use     Vaping Use: Never used   Substance Use Topics     Alcohol use: Yes     Alcohol/week: 1.0 standard drink of alcohol     Types: 1 Glasses of wine per week     Comment: 1 glass of wine with dinner     " Drug use: No     Comment: no herbal meds either       Health Maintenance Due   Topic Date Due     ADVANCE CARE PLANNING  Never done     HEPATITIS B IMMUNIZATION (2 of 3 - 19+ 3-dose series) 04/02/2004     ZOSTER IMMUNIZATION (1 of 2) Never done     TSH W/FREE T4 REFLEX  06/23/2023     MAMMO SCREENING  07/06/2023       Lab Results   Component Value Date    PAP NIL 04/26/2017 July 6, 2023 12:54 PM

## 2023-07-06 NOTE — PATIENT INSTRUCTIONS
[email or web address]    Black pineda  Revaree suppository, hyaluronic acid  (no hormones)      Preventive Health Recommendations  Female Ages 50 - 64    Yearly exam: See your health care provider every year in order to  Review health changes.   Discuss preventive care.    Review your medicines if your doctor has prescribed any.    Get a Pap test every three years (unless you have an abnormal result and your provider advises testing more often).  If you get Pap tests with HPV test, you only need to test every 5 years, unless you have an abnormal result.   You do not need a Pap test if your uterus was removed (hysterectomy) and you have not had cancer.  You should be tested each year for STDs (sexually transmitted diseases) if you're at risk.   Have a mammogram every 1 to 2 years.  Have a colonoscopy at age 50, or have a yearly FIT test (stool test). These exams screen for colon cancer.    Have a cholesterol test every 5 years, or more often if advised.  Have a diabetes test (fasting glucose) every three years. If you are at risk for diabetes, you should have this test more often.   If you are at risk for osteoporosis (brittle bone disease), think about having a bone density scan (DEXA).    Shots: Get a flu shot each year. Get a tetanus shot every 10 years.    Nutrition:   Eat at least 5 servings of fruits and vegetables each day.  Eat whole-grain bread, whole-wheat pasta and brown rice instead of white grains and rice.  Get adequate Calcium and Vitamin D.     Lifestyle  Exercise at least 150 minutes a week (30 minutes a day, 5 days a week). This will help you control your weight and prevent disease.  Limit alcohol to one drink per day.  No smoking.   Wear sunscreen to prevent skin cancer.   See your dentist every six months for an exam and cleaning.  See your eye doctor every 1 to 2 years.

## 2023-07-06 NOTE — PATIENT INSTRUCTIONS
We will check on the spot again in 3 months. Looks like a healed erosion today but want to make sure it is not an early basal cell carcinoma at the re-check. Use vaseline and sunscreen daily to help the lesion heal up.

## 2023-07-06 NOTE — NURSING NOTE
Chief Complaint   Patient presents with     Derm Problem     Pt has a spot of concern above L eyebrow.     Moises Godoy, EMT

## 2023-07-06 NOTE — PROGRESS NOTES
"[facility] Dermatology Note  Encounter Date: Jul 6, 2023  Office Visit     Dermatology Problem List:  #. Lesion to monitor, left supramedial brow. Favor scar 2/2 traumatic erosion vs less likely BCC  - 3 mm, photo taken 7/6/23.  1. Multiple benign nevi.   2. SKs, solar lentigos, cherry angiomas  3. AK of the right nasal dorsum s/p LN2 on 5/3/21  3. History of indoor tanning.     ____________________________________________    Assessment & Plan:     # NUB, favor healed traumatic erosion vs less likely early BCC. 3 mm flesh to slightly hyperpigmented papule with no arborizing vessels or pigment globules.  - Monitor: Patient to continue monitoring at home and will contact the clinic for any changes.  - Daily sunscreen to lesion in addition to vaseline  - Will see back in 3 months for measurement and evaluation   - Photo and measurement taken today    #Actinic skin damage  #Lentigines  - discussed sunprotective behaviors, clothing, and the use of sunscreen      Procedures Performed:   None      Follow-up: 3 months for recheck    Staff and Resident:    I, Cosme Khoury MD, discussed and evaluated the patient with Dr. Francisco.    ____________________________________________    CC: Derm Problem    HPI:  Ms. Shanna Kang is a(n) 53 year old female who presents today as a return patient for spot check. Per Gent, \"I have had a sore that hasn t changed much for the last two months.  I felt like it was better but today I noticed it s back to where it s been.  I do feel like I heal more slowly as I age, and awhile I m not too concerned I just keep seeing it in the mirror.  I don t remember having an injury or something that broke the skin?  I am a  by nature but this was not the case with this, and if that were the case, it would be healed.\"    Patient is otherwise feeling well, without additional skin concerns.    Labs Reviewed:  N/A    Physical Exam:  Vitals: LMP 01/01/2021   SKIN: Focused " examination of left supramedial eyebrow was performed.  - 3 mm flesh to slightly hyperpigmented paule with no arborizing vessels or pigment globules.  - No other lesions of concern on areas examined.     Medications:  Current Outpatient Medications   Medication    B Complex Vitamins (B COMPLEX PO)    buPROPion (WELLBUTRIN XL) 300 MG 24 hr tablet    calcium 600 MG tablet    ibuprofen (ADVIL,MOTRIN) 600 MG tablet    levothyroxine (SYNTHROID/LEVOTHROID) 112 MCG tablet    LORazepam (ATIVAN) 0.5 MG tablet    LORazepam (ATIVAN) 0.5 MG tablet    norethindrone-ethinyl estradiol (FEMHRT 1/5) 1-5 MG-MCG tablet    Omega-3 Fatty Acids (FISH OIL PO)    rosuvastatin (CRESTOR) 20 MG tablet    sertraline (ZOLOFT) 25 MG tablet    sertraline (ZOLOFT) 50 MG tablet    valACYclovir (VALTREX) 1000 mg tablet    Vitamin D3 (CHOLECALCIFEROL) 25 mcg (1000 units) tablet     No current facility-administered medications for this visit.      Past Medical History:   Patient Active Problem List   Diagnosis    Allergic rhinitis due to pollen    Depressive disorder, not elsewhere classified    ISOLATED OR SPECIFIC PHOBIAS NEC- flying     Hyperlipidemia LDL goal <130    Hypothyroidism    Scoliosis    Menstrual irregularity    Non-celiac gluten sensitivity    Dairy product intolerance    Irritable bowel syndrome with both constipation and diarrhea     Past Medical History:   Diagnosis Date    Constipation     Depressive disorder, not elsewhere classified     was on prozac in past, now on Wellbutrin    Hypothyroid     IBS (irritable bowel syndrome)     Better off gluten and dairy    Infectious mononucleosis 2001    Other kyphoscoliosis and scoliosis     see surgical history - multiple surgeries    Pure hypercholesterolemia        CC No referring provider defined for this encounter. on close of this encounter.

## 2023-07-06 NOTE — LETTER
"7/6/2023       RE: Shanna Kang  [address] [address]     Dear Colleague,    Thank you for referring your patient, Shanna Kang, to the [facility] [place] at [facility]. Please see a copy of my visit note below.    [facility] Dermatology Note  Encounter Date: Jul 6, 2023  Office Visit     Dermatology Problem List:  #. Lesion to monitor, left supramedial brow. Favor scar 2/2 traumatic erosion vs less likely BCC  - 3 mm, photo taken 7/6/23.  1. Multiple benign nevi.   2. SKs, solar lentigos, cherry angiomas  3. AK of the right nasal dorsum s/p LN2 on 5/3/21  3. History of indoor tanning.     ____________________________________________    Assessment & Plan:     # NUB, favor healed traumatic erosion vs less likely early BCC. 3 mm flesh to slightly hyperpigmented papule with no arborizing vessels or pigment globules.  - Monitor: Patient to continue monitoring at home and will contact the clinic for any changes.  - Daily sunscreen to lesion in addition to vaseline  - Will see back in 3 months for measurement and evaluation   - Photo and measurement taken today    #Actinic skin damage  #Lentigines  - discussed sunprotective behaviors, clothing, and the use of sunscreen      Procedures Performed:   None      Follow-up: 3 months for recheck    Staff and Resident:    I, Cosme Khoury MD, discussed and evaluated the patient with Dr. Francisco.    ____________________________________________    CC: Derm Problem    HPI:  Ms. Shanna Kang is a(n) 53 year old female who presents today as a return patient for spot check. Per Amie, \"I have had a sore that hasn t changed much for the last two months.  I felt like it was better but today I noticed it s back to where it s been.  I do feel like I heal more slowly as I age, and awhile I m not too concerned I just keep seeing it in the mirror.  I don t " "remember having an injury or something that broke the skin?  I am a  by nature but this was not the case with this, and if that were the case, it would be healed.\"    Patient is otherwise feeling well, without additional skin concerns.    Labs Reviewed:  N/A    Physical Exam:  Vitals: LMP 01/01/2021   SKIN: Focused examination of left supramedial eyebrow was performed.  - 3 mm flesh to slightly hyperpigmented paule with no arborizing vessels or pigment globules.  - No other lesions of concern on areas examined.     Medications:  Current Outpatient Medications   Medication    B Complex Vitamins (B COMPLEX PO)    buPROPion (WELLBUTRIN XL) 300 MG 24 hr tablet    calcium 600 MG tablet    ibuprofen (ADVIL,MOTRIN) 600 MG tablet    levothyroxine (SYNTHROID/LEVOTHROID) 112 MCG tablet    LORazepam (ATIVAN) 0.5 MG tablet    LORazepam (ATIVAN) 0.5 MG tablet    norethindrone-ethinyl estradiol (FEMHRT 1/5) 1-5 MG-MCG tablet    Omega-3 Fatty Acids (FISH OIL PO)    rosuvastatin (CRESTOR) 20 MG tablet    sertraline (ZOLOFT) 25 MG tablet    sertraline (ZOLOFT) 50 MG tablet    valACYclovir (VALTREX) 1000 mg tablet    Vitamin D3 (CHOLECALCIFEROL) 25 mcg (1000 units) tablet     No current facility-administered medications for this visit.      Past Medical History:   Patient Active Problem List   Diagnosis    Allergic rhinitis due to pollen    Depressive disorder, not elsewhere classified    ISOLATED OR SPECIFIC PHOBIAS NEC- flying     Hyperlipidemia LDL goal <130    Hypothyroidism    Scoliosis    Menstrual irregularity    Non-celiac gluten sensitivity    Dairy product intolerance    Irritable bowel syndrome with both constipation and diarrhea     Past Medical History:   Diagnosis Date    Constipation     Depressive disorder, not elsewhere classified     was on prozac in past, now on Wellbutrin    Hypothyroid     IBS (irritable bowel syndrome)     Better off gluten and dairy    Infectious mononucleosis 2001    Other kyphoscoliosis " and scoliosis     see surgical history - multiple surgeries    Pure hypercholesterolemia        CC No referring provider defined for this encounter. on close of this encounter.    Attestation with edits by Richard Francisco MD at 7/6/2023  4:14 PM:  I have personally examined this patient and agree with the resident doctor's documentation and plan of care. I have reviewed and amended the resident's note. The documentation accurately reflects my clinical observations, diagnoses, treatment and follow-up plans.     Richard Francisco MD  Dermatology Staff

## 2023-07-06 NOTE — PROGRESS NOTES
"Shanna Kang is a 52 yo woman with hx of hypothyroidism, scoliosis, IBS, depressive disorder. She is here for a preventive exam.  She is not fasting. She is up to date on eye exams and dental visits.     HCM  Advanced Directive: not on file, information given  COVID vaccine series: up to date  Other vaccinations: shingrix series is due (pharmacy)  Pap due 2027  Mammogram: Last completed 7/6/2022, due now  Colonoscopy: due 2025    Diet: Widely varied, \"I eat well but I probably eat too much.\" Not much gluten or red meat.  Wt Readings from Last 4 Encounters:   07/06/23 86.2 kg (190 lb)   06/23/22 81.2 kg (179 lb 1.3 oz)   03/09/21 85.3 kg (188 lb)   02/10/21 84 kg (185 lb 1.6 oz)     Body mass index is 29.76 kg/m .    Hyperlipidemia LDL goal <130  Shanna takes rosuvastatin 10-15 mg daily for treatment of hyperlipidemia. She has an Rx for rosuvastatin 20 mg daily, but breaks the tablets in half and tries to take the larger piece. She does sometimes take the entire 20 mg tablet. She notes that she is very sensitive to statin use. She has a family history of hyperlipidemia in her mother and aunt. Her mother, who is intolerant of statins, recently had a CT calcium scoring with good results. Shanna denies exercise limitation. No history of diabetes, gestational or otherwise.     Recent Labs   Lab Test 06/23/22  1438 06/10/21  0753 03/02/21  0728 09/16/20  0832 04/28/17  0826 08/26/15  0755   CHOL 197 216*   < > 250.0*   < > 244*   HDL 89 99   < > 90.0   < > 78   LDL 70 107*   < > 147.0*   < > 152*   TRIG 188* 50   < > 64.0   < > 72   CHOLHDLRATIO  --   --   --  2.8  --  3.1    < > = values in this interval not displayed.     Minor depressive disorder  Shanna takes bupropion 300 mg daily and sertraline 25 mg daily for treatment of depression. She notes she started the sertraline after an increase in anxiety related to family stressors (adult son with mental health stressors). Rx was prescribed by her mother who is a retired " "physician. 25mg dose of sertraline has been helpful. 50mg dose blunted her mood. Shanna is not currently in therapy, but is interested in pursuing this. She has access to EAP at work.  She states that her mental health is overall doing well, but does report feeling \"blah.\" She notes that she has felt run down and \"off\" recently, but has tested negative for Covid and has no other URI symptoms. She wonders if this is related to menopause and her hot flashes.         3/9/2021     4:50 PM 6/23/2022     1:54 PM 7/6/2023    12:50 PM   PHQ   PHQ-9 Total Score 3 3 3   Q9: Thoughts of better off dead/self-harm past 2 weeks Not at all Not at all Not at all         3/9/2021     4:50 PM 6/23/2022     1:54 PM 7/6/2023    12:50 PM   BORIS-7 SCORE   Total Score 0 5 2     Acquired hypothyroidism  Shanna takes levothyroxine 112 mcg daily for treatment of hypothyroidism. She takes this in the morning on an empty stomach with other medications. She fluctuates between diarrhea and constipation as she has a history of irritable bowel, but is mostly regular.     TSH   Date Value Ref Range Status   06/23/2022 0.81 0.30 - 4.20 uIU/mL Final   06/10/2021 2.44 0.40 - 4.00 mU/L Final       PMH, PSH, FH, medications, allergies and immunizations are reviewed/ updated this visit.    Social  , works for [employer]  Has a set of boy/girl twins 23yo     HABITS:  Tob: none  ETOH: 1-2 drinks/week  Calcium:  coconut milk, calcium supplement + Vit D  Caffeine: 3-4/day   Exercise: walking daily, yoga 3-4x/week     OB/GYN HISTORY:  LMP:Jan 2021,  No vaginal bleeding  Hx abnormal pap?  no  Vasomotor sx:  Hot flashes, worsened about 1 week after last annual wellness visit. Waking her from sleep. Sweating, especially at night. Also reports feeling \"blah.\" She has taken OCPs when younger, but this made her feel \"rincon.\" Still, is interested in discussing options for treatment of menopausal symptoms.   Vaginal dryness: sometimes with " intercourse, uses lubrication  G 2   P 2   A 0    Current Outpatient Medications   Medication Sig Dispense Refill     B Complex Vitamins (B COMPLEX PO)        buPROPion (WELLBUTRIN XL) 300 MG 24 hr tablet Take 1 tablet (300 mg) by mouth every morning 30 tablet 0     calcium 600 MG tablet Take 1 tablet (600 mg) by mouth 90 tablet 3     ibuprofen (ADVIL,MOTRIN) 600 MG tablet Take 1 tablet by mouth 3 times daily as needed.       levothyroxine (SYNTHROID/LEVOTHROID) 112 MCG tablet Take 1 tablet (112 mcg) by mouth daily 30 tablet 0     Omega-3 Fatty Acids (FISH OIL PO)        rosuvastatin (CRESTOR) 20 MG tablet Take 1 tablet (20 mg) by mouth daily 90 tablet 3     sertraline (ZOLOFT) 50 MG tablet Take 1 tablet by mouth daily at 2 pm Only takes half a pill (25 MG)       Vitamin D3 (CHOLECALCIFEROL) 25 mcg (1000 units) tablet Take 1 tablet (25 mcg) by mouth daily 90 tablet 3     LORazepam (ATIVAN) 0.5 MG tablet Take 1 tablet (0.5 mg) by mouth every 6 hours as needed for anxiety (Patient not taking: Reported on 7/6/2023) 20 tablet 0     LORazepam (ATIVAN) 0.5 MG tablet Take 30 minutes prior to departure.  Do not operate a vehicle after taking this medication (Patient not taking: Reported on 7/6/2023) 10 tablet 0     Allergies   Allergen Reactions     Nkda [No Known Drug Allergy]          ROS  CONSTITUTIONAL:NEGATIVE for fever, chills, +10 lb weight gain in past year  INTEGUMENTARY/SKIN: + small red spots above left eyebrow, following with dermatologist , recent cold sore outbreak lower lip, uses valacyclovir, 1-2 episodes per year.   EYES: NEGATIVE for vision changes or irritation  ENT/MOUTH: NEGATIVE for ear, mouth and throat problems  RESP:NEGATIVE for significant cough or SOB  BREAST: NEGATIVE for masses, tenderness or discharge  CV: NEGATIVE for chest pain, palpitations, BOLAND, orthopnea, PND  or peripheral edema  GI: NEGATIVE for nausea, abdominal pain, heartburn, or change in bowel habits, some irritable bowel,  "stable  :NEGATIVE for frequency, dysuria, or hematuria, + some mild stress incontinence  MUSCULOSKELETAL:NEGATIVE for significant arthralgias or myalgia,+ hx of multiple fusions of her spine, back pain is stable  NEURO: NEGATIVE for weakness or dizziness. + persistent paresthesia at the left lateral thigh, occasional left foot numbness  ENDOCRINE: NEGATIVE for polyuria/dipsia,  temperature intolerance, skin/hair changes +thyroid disease see above  HEME/ALLERGY/IMMUNE: NEGATIVE for bleeding problems  PSYCHIATRIC: see above HPI    EXAM  /76 (BP Location: Right arm, Patient Position: Sitting, Cuff Size: Adult Regular)   Pulse 55   Temp 97.3  F (36.3  C) (Skin)   Resp 15   Ht 1.702 m (5' 7\")   Wt 86.2 kg (190 lb)   LMP 01/01/2021   SpO2 99%   BMI 29.76 kg/m      GENERAL APPEARANCE: Alert, pleasant, NAD  EYES: PERRL, EOMI, conjunctiva clear  HENT: TM normal bilaterally. Nares clear. OP clear. Healing ulceration at lateral left lower lip, vermilion border, no surrounding redness.  NECK: no adenopathy, thyroid normal to palpation  RESP: lungs clear to auscultation bilaterally  BREAST: normal without masses, no tenderness or nipple discharge and no palpable  axillary masses or adenopathy  CV: regular rate and rhythm, normal S1 S2, no murmur, no carotid bruits  ABDOMEN: soft, nontender, without HSM or masses. Bowel sounds normal  MS: extremities normal- no gross deformities noted, no tender, hot or swollen joints.  Well healed incisions over T and LS spine  SKIN: no suspicious lesions or rashes,+ small red macules over left upper brow, no scaling or blanching. Cannot appreciate telangectasia  NEURO: Normal strength and tone, sensory exam grossly normal, DTR normoreflexive in upper and lower extremities Subjective numbness, \"prickly\" sensation over left lateral thigh, (chronic per pt, since spinal fusion surgery)  PSYCH: mentation appears normal. and affect normal/bright.  EXT: no peripheral edema, pedal " pulses palpable    Assessment:  (Z00.00) Routine general medical examination at a health care facility  (primary encounter diagnosis)  Comment: Healthy 53 year old woman.   Plan: Today we discussed ways to maintain a healthy lifestyle with sensible eating, regular exercise and self cares. We dicussed calcium and Vitamin D intake, vaccinations and preventive health screens. Recommended getting the shingrix vaccine series at a pharmacy.    (Z12.31) Encounter for screening mammogram for breast cancer  Comment: Routine screening. Normal clinical exam  Plan: MA Screening Bilateral w/ Beny          (E78.5) Hyperlipidemia LDL goal <130  Comment: Not fasting.  Currently taking rosuvastatin 20mg pill, 0.5 tablet daily, LDL acceptable  Recent Labs   Lab Test 07/06/23  1355 06/23/22  1438 03/02/21  0728 09/16/20  0832 04/28/17  0826 08/26/15  0755   CHOL 211* 197   < > 250.0*   < > 244*   HDL 95 89   < > 90.0   < > 78   * 70   < > 147.0*   < > 152*   TRIG 72 188*   < > 64.0   < > 72   CHOLHDLRATIO  --   --   --  2.8  --  3.1    < > = values in this interval not displayed.   Plan: Lipid Profile,         rosuvastatin (CRESTOR) 20 MG tablet        Medication refilled.  Could consider Coronary CT scan for this patient. She is asymptomatic for coronary artery disease at this time.     (F32.89) Minor depressive disorder  Comment: Doing well on current medications, bupropion and sertraline. Started sertraline within the last year (her mother prescribed this and no refills needed from me). Mood is stable. No current therapy but she is interested in pursing this.   Plan: buPROPion (WELLBUTRIN XL) 300 MG 24 hr tablet,         sertraline (ZOLOFT) 25 MG tablet        Medication refilled. Gave information on [employer] website's tools for finding a therapist. Discussed possibility of finding a therapist through her Employee Assistance Program.  Contact me if you wish for me to enter referral for mental health services through  [facility].     (E03.9) Acquired hypothyroidism  Comment: Taking medication consistently in the morning on an empty stomach with other medications. Euthyroid by report.   TSH   Date Value Ref Range Status   07/06/2023 1.56 0.30 - 4.20 uIU/mL Final   06/10/2021 2.44 0.40 - 4.00 mU/L Final     Plan: TSH with free T4 reflex, levothyroxine         (SYNTHROID/LEVOTHROID) 112 MCG tablet        Medication refilled, no change in dose    (N95.1) Symptomatic menopausal or female climacteric states  Comment: Current hot flashes which are disrupting sleep. She wished to discuss options.   Plan: norethindrone-ethinyl estradiol (FEMHRT 1/5)         1-5 MG-MCG tablet        Discussed non-hormonal vs hormonal treatment options. Printed Rx for FemHRT. Discussed potential benefits and side effects of this medication with the patient. Contact MD for any acute concerns/questions.  She is going to try Black Cohash first to see if this will help.     (B00.1) Cold sore  Comment: healing cold sore on lower lip  Plan: valACYclovir (VALTREX) 1000 mg tablet        Refilled valacyclovir for prn use      I have reviewed, edited and approved the above scribed note.    Daria Cadet MD  Internal Medicine/Pediatrics      I, Delores Yu, am serving as a scribe to document services personally performed by Dr. Daria Cadet, based on data collection and the provider's statements to me.

## 2023-07-07 PROBLEM — B00.1 COLD SORE: Status: ACTIVE | Noted: 2023-07-07

## 2023-07-07 PROBLEM — N95.1 SYMPTOMATIC MENOPAUSAL OR FEMALE CLIMACTERIC STATES: Status: ACTIVE | Noted: 2023-07-07

## 2023-07-18 ENCOUNTER — ANCILLARY PROCEDURE (OUTPATIENT)
Dept: MAMMOGRAPHY | Facility: CLINIC | Age: 53
End: 2023-07-18
Attending: INTERNAL MEDICINE
Payer: COMMERCIAL

## 2023-07-18 DIAGNOSIS — Z12.31 ENCOUNTER FOR SCREENING MAMMOGRAM FOR BREAST CANCER: ICD-10-CM

## 2023-07-18 PROCEDURE — 77063 BREAST TOMOSYNTHESIS BI: CPT | Mod: GC

## 2023-07-18 PROCEDURE — 77067 SCR MAMMO BI INCL CAD: CPT | Mod: GC

## 2023-09-15 DIAGNOSIS — F32.89 MINOR DEPRESSIVE DISORDER: ICD-10-CM

## 2023-09-15 RX ORDER — SERTRALINE HYDROCHLORIDE 25 MG/1
25 TABLET, FILM COATED ORAL DAILY
Qty: 90 TABLET | Refills: 1 | Status: SHIPPED | OUTPATIENT
Start: 2023-09-15 | End: 2024-03-11

## 2023-09-15 NOTE — TELEPHONE ENCOUNTER
Pt saw Dr. Cadet 7/6/23 for physical. She takes 25 mg sertraline PO daily and at that time had sufficient medication on hand. She now needs a refill. Will discontinue previous order for 50 mg sertraline to reflect correct dosage and send refills to requesting pharmacy.    Pasquale BURNS, RN  09/15/23 3:44 PM

## 2024-02-24 ENCOUNTER — MYC MEDICAL ADVICE (OUTPATIENT)
Dept: FAMILY MEDICINE | Facility: CLINIC | Age: 54
End: 2024-02-24

## 2024-02-24 DIAGNOSIS — F41.8 SITUATIONAL ANXIETY: ICD-10-CM

## 2024-02-25 RX ORDER — LORAZEPAM 0.5 MG/1
TABLET ORAL
Qty: 10 TABLET | Refills: 0 | Status: SHIPPED | OUTPATIENT
Start: 2024-02-25

## 2024-03-11 DIAGNOSIS — F32.89 MINOR DEPRESSIVE DISORDER: ICD-10-CM

## 2024-03-12 RX ORDER — SERTRALINE HYDROCHLORIDE 25 MG/1
25 TABLET, FILM COATED ORAL DAILY
Qty: 30 TABLET | Refills: 0 | Status: SHIPPED | OUTPATIENT
Start: 2024-03-12 | End: 2024-07-14

## 2024-03-12 NOTE — TELEPHONE ENCOUNTER
Sertraline (Zoloft) 25 mg    Last Office Visit: 7/6/23  Future [facility] Appointments: None  Medication last refilled: 9/15/23 #90 with 1 refill(s)    PHQ-9 / BORIS-7 Scores  6/23/22 7/6/23   BORIS-7 Score DocFlow 5 2   PHQ-9 Score DocFlow 3 3     Medication is being filled for 1 time refill only due to:  Patient needs to be seen because due for mental health follow up.    [employer] message sent to patient to call and schedule appointment.    Melissa Hyman, PETERN, RN, CCM

## 2024-07-14 NOTE — PATIENT INSTRUCTIONS
Calcium 1200mg  Vitamin D 1000 international unit(s) daily    Patient Education   Preventive Care Advice   This is general advice given by our system to help you stay healthy. However, your care team may have specific advice just for you. Please talk to your care team about your preventive care needs.  Nutrition  Eat 5 or more servings of fruits and vegetables each day.  Try wheat bread, brown rice and whole grain pasta (instead of white bread, rice, and pasta).  Get enough calcium and vitamin D. Check the label on foods and aim for 100% of the RDA (recommended daily allowance).  Lifestyle  Exercise at least 150 minutes each week  (30 minutes a day, 5 days a week).  Do muscle strengthening activities 2 days a week. These help control your weight and prevent disease.  No smoking.  Wear sunscreen to prevent skin cancer.  Have a dental exam and cleaning every 6 months.  Yearly exams  See your health care team every year to talk about:  Any changes in your health.  Any medicines your care team has prescribed.  Preventive care, family planning, and ways to prevent chronic diseases.  Shots (vaccines)   HPV shots (up to age 26), if you've never had them before.  Hepatitis B shots (up to age 59), if you've never had them before.  COVID-19 shot: Get this shot when it's due.  Flu shot: Get a flu shot every year.  Tetanus shot: Get a tetanus shot every 10 years.  Pneumococcal, hepatitis A, and RSV shots: Ask your care team if you need these based on your risk.  Shingles shot (for age 50 and up)  General health tests  Diabetes screening:  Starting at age 35, Get screened for diabetes at least every 3 years.  If you are younger than age 35, ask your care team if you should be screened for diabetes.  Cholesterol test: At age 39, start having a cholesterol test every 5 years, or more often if advised.  Bone density scan (DEXA): At age 50, ask your care team if you should have this scan for osteoporosis (brittle bones).  Hepatitis  C: Get tested at least once in your life.  STIs (sexually transmitted infections)  Before age 24: Ask your care team if you should be screened for STIs.  After age 24: Get screened for STIs if you're at risk. You are at risk for STIs (including HIV) if:  You are sexually active with more than one person.  You don't use condoms every time.  You or a partner was diagnosed with a sexually transmitted infection.  If you are at risk for HIV, ask about PrEP medicine to prevent HIV.  Get tested for HIV at least once in your life, whether you are at risk for HIV or not.  Cancer screening tests  Cervical cancer screening: If you have a cervix, begin getting regular cervical cancer screening tests starting at age 21.  Breast cancer scan (mammogram): If you've ever had breasts, begin having regular mammograms starting at age 40. This is a scan to check for breast cancer.  Colon cancer screening: It is important to start screening for colon cancer at age 45.  Have a colonoscopy test every 10 years (or more often if you're at risk) Or, ask your provider about stool tests like a FIT test every year or Cologuard test every 3 years.  To learn more about your testing options, visit:   .  For help making a decision, visit:   https://bit.ly/bi51803.  Prostate cancer screening test: If you have a prostate, ask your care team if a prostate cancer screening test (PSA) at age 55 is right for you.  Lung cancer screening: If you are a current or former smoker ages 50 to 80, ask your care team if ongoing lung cancer screenings are right for you.  For informational purposes only. Not to replace the advice of your health care provider. Copyright   2023 [employer]. All rights reserved. Clinically reviewed by the [facility] Transitions Program. "[employer] 485728 - REV 01/24.

## 2024-07-14 NOTE — PROGRESS NOTES
Shanna Kang is a 53 yo woman with hx of hypothyroidism, scoliosis, IBS, depressive disorder. She is here for a preventive exam.   She is fasting. She is  up to date on eye exams and dental visits.    HCM  Advanced directive: none on file --given today  COVID vaccine up to date  Other vaccines  Shingles vaccine series due ---defer today, will schedule in future given potential side effects  Colon cancer screening  due 2025  Pap due 2027  Mammogram due now      Diet: omnivore, leaner meats  Down #13., Weight watchers, logging food,  feeling better, goal is high 160s  Wt Readings from Last 4 Encounters:   07/16/24 80.3 kg (177 lb)   07/06/23 86.2 kg (190 lb)   06/23/22 81.2 kg (179 lb 1.3 oz)   03/09/21 85.3 kg (188 lb)     Body mass index is 27.62 kg/m .      Cold sores   Once a year  Refills needed     ADD type symptoms  Procrastinator  Struggle to stay focused  Reads same line several times  Has not had formal testing    Minor depressive disorder  Bupropion XL 300mg daily  Sertraline 25mg daily, cuts in half  Current stressors-- job  Therapist --none currently  Sleep OK      6/23/2022     1:54 PM 7/6/2023    12:50 PM 7/16/2024     8:33 AM   PHQ   PHQ-9 Total Score 3 3 6   Q9: Thoughts of better off dead/self-harm past 2 weeks Not at all Not at all Not at all         6/23/2022     1:54 PM 7/6/2023    12:50 PM 7/16/2024     8:34 AM   BORIS-7 SCORE   Total Score   6 (mild anxiety)   Total Score 5 2 6       Hypothyroidism  Levothyroxine 112mcg daily  Hx of IBS, takes probiotics Constipation / diarrhea --tends toward constipation  Palpitations none  Consistent with dosing morning, takes all of her pills together  TSH   Date Value Ref Range Status   07/06/2023 1.56 0.30 - 4.20 uIU/mL Final   06/10/2021 2.44 0.40 - 4.00 mU/L Final       Symptomatic menopausal or female climacteric states  FemHRT 1/5mg daily--started last month  Hot flashes improving  Vaginal dryness ++  Breakthrough bleeding --none    Hyperlipidemia LDL  goal <130  Rosuvastatin 20mg daily, take 0.5 - 1 tab daily, triggers achy joints  Chest pain/ palpitations, dizziness --none  Recent Labs   Lab Test 07/06/23  1355 06/23/22  1438 03/02/21  0728 09/16/20  0832   CHOL 211* 197   < > 250.0*   HDL 95 89   < > 90.0   * 70   < > 147.0*   TRIG 72 188*   < > 64.0   CHOLHDLRATIO  --   --   --  2.8    < > = values in this interval not displayed.         PMH, PSH, FH, medications, allergies and immunizations are updated this visit.      Social    Nurse --educator with [employer]  25 yo twin daughter and son     HABITS:  Tob: none former smoker quit 1999  ETOH: 1-2 drinks/week  Calcium: +/- in diet, calcium supplement + Vit D 1000  Caffeine: 3-4+/day   Exercise: walking daily, yoga 3-5x/week     OB/GYN HISTORY:  LMP:Jan 2021, no vaginal bleeding  OCP, started one month ago, helping hot flashes  Hx abnormal pap?  no  Vasomotor sx:  Hot flashes,   G 2   P 2   A 0      Current Outpatient Medications   Medication Sig Dispense Refill    B Complex Vitamins (B COMPLEX PO)       buPROPion (WELLBUTRIN XL) 300 MG 24 hr tablet Take 1 tablet (300 mg) by mouth every morning 90 tablet 3    calcium 600 MG tablet Take 1 tablet (600 mg) by mouth 90 tablet 3    ibuprofen (ADVIL,MOTRIN) 600 MG tablet Take 1 tablet by mouth 3 times daily as needed.      levothyroxine (SYNTHROID/LEVOTHROID) 112 MCG tablet Take 1 tablet (112 mcg) by mouth daily 90 tablet 3    LORazepam (ATIVAN) 0.5 MG tablet Take 30 minutes prior to departure.  Do not operate a vehicle after taking this medication 10 tablet 0    norethindrone-ethinyl estradiol (FEMHRT 1/5) 1-5 MG-MCG tablet Take 1 tablet by mouth daily 90 tablet 3    Omega-3 Fatty Acids (FISH OIL PO)       rosuvastatin (CRESTOR) 20 MG tablet Take 1 tablet (20 mg) by mouth daily 90 tablet 3    sertraline (ZOLOFT) 25 MG tablet Take 1 tablet (25 mg) by mouth daily 30 tablet 0    valACYclovir (VALTREX) 1000 mg tablet Take 2 at onset of cold sore  "then repeat 2 tablets in 12hr 8 tablet 1    Vitamin D3 (CHOLECALCIFEROL) 25 mcg (1000 units) tablet Take 1 tablet (25 mcg) by mouth daily 90 tablet 3     Allergies   Allergen Reactions    Nkda [No Known Drug Allergy]          ROS  CONSTITUTIONAL:NEGATIVE for fever, chills, concerted weight loss  INTEGUMENTARY/SKIN: NEGATIVE for worrisome rashes, moles or lesions + cold sores  EYES: NEGATIVE for vision changes or irritation  ENT/MOUTH: NEGATIVE for ear, mouth and throat problems  RESP:NEGATIVE for significant cough or SOB  CV: NEGATIVE for chest pain, palpitations, BOLAND, orthopnea, PND  or peripheral edema  GI: NEGATIVE for nausea, abdominal pain, heartburn, or change in bowel habits,+ irritable bowel  :NEGATIVE for frequency, dysuria, or hematuria  MUSCULOSKELETAL:NEGATIVE for significant arthralgias or myalgia  NEURO: NEGATIVE for weakness, dizziness or paresthesias  ENDOCRINE: NEGATIVE for polyuria/dipsia,  temperature intolerance, skin/hair changes, +hypothyroidism  HEME/ALLERGY/IMMUNE: NEGATIVE for bleeding problems  PSYCHIATRIC: see above    EXAM  /72 (BP Location: Left arm, Patient Position: Sitting, Cuff Size: Adult Regular)   Pulse 61   Temp 98.2  F (36.8  C) (Skin)   Resp 14   Ht 1.705 m (5' 7.13\")   Wt 80.3 kg (177 lb)   LMP 01/01/2021   SpO2 97%   BMI 27.62 kg/m    GENERAL APPEARANCE: Alert, pleasant, NAD  EYES: PERRL, EOMI, conjunctiva clear  HENT: TM normal bilaterally. Nose and mouth without lesions  NECK: no adenopathy, thyroid normal to palpation  RESP: lungs clear to auscultation bilaterally  BREAST: normal without masses, no tenderness or nipple discharge and no palpable  axillary masses or adenopathy  CV: regular rate and rhythm, normal S1 S2, no murmur, no carotid bruits  ABDOMEN: soft, nontender, without HSM or masses. Bowel sounds normal  MS: extremities normal- no gross deformities noted, no tender, hot or swollen joints.    SKIN: no suspicious lesions or rashes  NEURO: Normal " strength and tone, sensory exam grossly normal, DTR normoreflexive in upper and lower extremities  PSYCH: mentation appears normal. and affect normal/bright.  EXT: no peripheral edema, pedal pulses palpable    Assessment:  (Z00.00) Routine general medical examination at a health care facility  (primary encounter diagnosis)  Comment: 54 year old woman in good health  Plan: Today we discussed ways to maintain a healthy lifestyle with sensible eating, regular exercise and self cares. We dicussed calcium and Vitamin D intake, vaccinations and preventive health screens.  She will get Shingrix vaccine in future. Wants to do this on a day where she has time to recover if she develops symptoms.       (Z12.31) Visit for screening mammogram  Comment: normal clinical breast exam  Plan: MA Screen Bilateral w/Beny        Referral placed    (F32.89) Minor depressive disorder  Comment: mood stable, some work stress  Plan: buPROPion (WELLBUTRIN XL) 300 MG 24 hr tablet,         sertraline (ZOLOFT) 25 MG tablet        Refilled medication for one year    (E03.9) Acquired hypothyroidism  Comment: euthyroid by history , some irritable bowel--more constipation  TSH at upper limit of normal but acceptable  TSH   Date Value Ref Range Status   07/16/2024 4.03 0.30 - 4.20 uIU/mL Final   06/10/2021 2.44 0.40 - 4.00 mU/L Final   Plan: levothyroxine (SYNTHROID/LEVOTHROID) 112 MCG         tablet, TSH with free T4 reflex        Will recommend rechecking in 3-6 months    (N95.1) Symptomatic menopausal or female climacteric states  Comment: started HRT only a month ago  Plan: norethindrone-ethinyl estradiol (FEMHRT 1/5)         1-5 MG-MCG tablet        Continue med, hot flashes improving, may take a while to see improvement with vaginal dryness    (E78.5) Hyperlipidemia LDL goal <130  Comment: takes 10mg , sometimes 20mg daily, LDL in target range  Recent Labs   Lab Test 07/16/24  0930 07/06/23  1355 03/02/21  0728 09/16/20  0832   CHOL 166 211*    < > 250.0*   HDL 75 95   < > 90.0   LDL 80 102*   < > 147.0*   TRIG 55 72   < > 64.0   CHOLHDLRATIO  --   --   --  2.8    < > = values in this interval not displayed.     Plan: Lipid Profile, rosuvastatin (CRESTOR) 20 MG         tablet        No change in dose    (B00.1) Cold sore  Comment: 1 outbreak a year  Plan: valACYclovir (VALTREX) 1000 mg tablet        Refilled medication    Daria Cadet MD  Internal Medicine/Pediatrics      Answers submitted by the patient for this visit:  Patient Health Questionnaire (Submitted on 7/16/2024)  If you checked off any problems, how difficult have these problems made it for you to do your work, take care of things at home, or get along with other people?: Somewhat difficult  PHQ9 TOTAL SCORE: 6  BORIS-7 (Submitted on 7/16/2024)  BORIS 7 TOTAL SCORE: 6

## 2024-07-16 ENCOUNTER — OFFICE VISIT (OUTPATIENT)
Dept: FAMILY MEDICINE | Facility: CLINIC | Age: 54
End: 2024-07-16
Payer: COMMERCIAL

## 2024-07-16 VITALS
DIASTOLIC BLOOD PRESSURE: 72 MMHG | HEART RATE: 61 BPM | BODY MASS INDEX: 27.78 KG/M2 | SYSTOLIC BLOOD PRESSURE: 112 MMHG | TEMPERATURE: 98.2 F | WEIGHT: 177 LBS | HEIGHT: 67 IN | OXYGEN SATURATION: 97 % | RESPIRATION RATE: 14 BRPM

## 2024-07-16 DIAGNOSIS — F32.89 MINOR DEPRESSIVE DISORDER: ICD-10-CM

## 2024-07-16 DIAGNOSIS — N95.1 SYMPTOMATIC MENOPAUSAL OR FEMALE CLIMACTERIC STATES: ICD-10-CM

## 2024-07-16 DIAGNOSIS — E78.5 HYPERLIPIDEMIA LDL GOAL <130: ICD-10-CM

## 2024-07-16 DIAGNOSIS — B00.1 COLD SORE: ICD-10-CM

## 2024-07-16 DIAGNOSIS — Z12.31 VISIT FOR SCREENING MAMMOGRAM: ICD-10-CM

## 2024-07-16 DIAGNOSIS — Z00.00 ROUTINE GENERAL MEDICAL EXAMINATION AT A HEALTH CARE FACILITY: Primary | ICD-10-CM

## 2024-07-16 DIAGNOSIS — E03.9 ACQUIRED HYPOTHYROIDISM: ICD-10-CM

## 2024-07-16 LAB
CHOLEST SERPL-MCNC: 166 MG/DL
FASTING STATUS PATIENT QL REPORTED: YES
HDLC SERPL-MCNC: 75 MG/DL
LDLC SERPL CALC-MCNC: 80 MG/DL
NONHDLC SERPL-MCNC: 91 MG/DL
TRIGL SERPL-MCNC: 55 MG/DL
TSH SERPL DL<=0.005 MIU/L-ACNC: 4.03 UIU/ML (ref 0.3–4.2)

## 2024-07-16 PROCEDURE — 84443 ASSAY THYROID STIM HORMONE: CPT | Mod: ORL | Performed by: INTERNAL MEDICINE

## 2024-07-16 PROCEDURE — 80061 LIPID PANEL: CPT | Mod: ORL | Performed by: INTERNAL MEDICINE

## 2024-07-16 RX ORDER — VALACYCLOVIR HYDROCHLORIDE 1 G/1
TABLET, FILM COATED ORAL
Qty: 8 TABLET | Refills: 1 | Status: SHIPPED | OUTPATIENT
Start: 2024-07-16

## 2024-07-16 RX ORDER — NORETHINDRONE ACETATE AND ETHINYL ESTRADIOL 1; 5 MG/1; UG/1
1 TABLET ORAL DAILY
Qty: 90 TABLET | Refills: 3 | Status: SHIPPED | OUTPATIENT
Start: 2024-07-16

## 2024-07-16 RX ORDER — ROSUVASTATIN CALCIUM 20 MG/1
20 TABLET, COATED ORAL DAILY
Qty: 90 TABLET | Refills: 3 | Status: SHIPPED | OUTPATIENT
Start: 2024-07-16

## 2024-07-16 RX ORDER — BUPROPION HYDROCHLORIDE 300 MG/1
300 TABLET ORAL EVERY MORNING
Qty: 90 TABLET | Refills: 3 | Status: SHIPPED | OUTPATIENT
Start: 2024-07-16

## 2024-07-16 RX ORDER — LEVOTHYROXINE SODIUM 112 UG/1
112 TABLET ORAL DAILY
Qty: 90 TABLET | Refills: 3 | Status: SHIPPED | OUTPATIENT
Start: 2024-07-16

## 2024-07-16 RX ORDER — SERTRALINE HYDROCHLORIDE 25 MG/1
25 TABLET, FILM COATED ORAL DAILY
Qty: 90 TABLET | Refills: 3 | Status: SHIPPED | OUTPATIENT
Start: 2024-07-16

## 2024-07-16 SDOH — HEALTH STABILITY: PHYSICAL HEALTH: ON AVERAGE, HOW MANY DAYS PER WEEK DO YOU ENGAGE IN MODERATE TO STRENUOUS EXERCISE (LIKE A BRISK WALK)?: 6 DAYS

## 2024-07-16 ASSESSMENT — PATIENT HEALTH QUESTIONNAIRE - PHQ9
10. IF YOU CHECKED OFF ANY PROBLEMS, HOW DIFFICULT HAVE THESE PROBLEMS MADE IT FOR YOU TO DO YOUR WORK, TAKE CARE OF THINGS AT HOME, OR GET ALONG WITH OTHER PEOPLE: SOMEWHAT DIFFICULT
SUM OF ALL RESPONSES TO PHQ QUESTIONS 1-9: 6
SUM OF ALL RESPONSES TO PHQ QUESTIONS 1-9: 6

## 2024-07-16 ASSESSMENT — ANXIETY QUESTIONNAIRES
GAD7 TOTAL SCORE: 6
8. IF YOU CHECKED OFF ANY PROBLEMS, HOW DIFFICULT HAVE THESE MADE IT FOR YOU TO DO YOUR WORK, TAKE CARE OF THINGS AT HOME, OR GET ALONG WITH OTHER PEOPLE?: SOMEWHAT DIFFICULT
7. FEELING AFRAID AS IF SOMETHING AWFUL MIGHT HAPPEN: SEVERAL DAYS
IF YOU CHECKED OFF ANY PROBLEMS ON THIS QUESTIONNAIRE, HOW DIFFICULT HAVE THESE PROBLEMS MADE IT FOR YOU TO DO YOUR WORK, TAKE CARE OF THINGS AT HOME, OR GET ALONG WITH OTHER PEOPLE: SOMEWHAT DIFFICULT
GAD7 TOTAL SCORE: 6
3. WORRYING TOO MUCH ABOUT DIFFERENT THINGS: SEVERAL DAYS
6. BECOMING EASILY ANNOYED OR IRRITABLE: SEVERAL DAYS
GAD7 TOTAL SCORE: 6
7. FEELING AFRAID AS IF SOMETHING AWFUL MIGHT HAPPEN: SEVERAL DAYS
1. FEELING NERVOUS, ANXIOUS, OR ON EDGE: SEVERAL DAYS
2. NOT BEING ABLE TO STOP OR CONTROL WORRYING: SEVERAL DAYS
4. TROUBLE RELAXING: SEVERAL DAYS
5. BEING SO RESTLESS THAT IT IS HARD TO SIT STILL: NOT AT ALL

## 2024-07-16 ASSESSMENT — SOCIAL DETERMINANTS OF HEALTH (SDOH): HOW OFTEN DO YOU GET TOGETHER WITH FRIENDS OR RELATIVES?: TWICE A WEEK

## 2024-07-16 NOTE — NURSING NOTE
"54 year old  Chief Complaint   Patient presents with    Physical     No concerns.        Blood pressure 112/72, pulse 61, temperature 98.2  F (36.8  C), temperature source Skin, resp. rate 14, height 1.705 m (5' 7.13\"), weight 80.3 kg (177 lb), last menstrual period 01/01/2021, SpO2 97%, not currently breastfeeding. Body mass index is 27.62 kg/m .  Patient Active Problem List   Diagnosis    Allergic rhinitis due to pollen    Depressive disorder, not elsewhere classified    ISOLATED OR SPECIFIC PHOBIAS NEC- flying     Hyperlipidemia LDL goal <130    Hypothyroidism    Scoliosis    Menstrual irregularity    Non-celiac gluten sensitivity    Dairy product intolerance    Irritable bowel syndrome with both constipation and diarrhea    Symptomatic menopausal or female climacteric states    Cold sore       Wt Readings from Last 2 Encounters:   07/16/24 80.3 kg (177 lb)   07/06/23 86.2 kg (190 lb)     BP Readings from Last 3 Encounters:   07/16/24 112/72   07/06/23 124/76   06/23/22 126/75         Current Outpatient Medications   Medication Sig Dispense Refill    B Complex Vitamins (B COMPLEX PO)       buPROPion (WELLBUTRIN XL) 300 MG 24 hr tablet Take 1 tablet (300 mg) by mouth every morning 90 tablet 3    calcium 600 MG tablet Take 1 tablet (600 mg) by mouth 90 tablet 3    ibuprofen (ADVIL,MOTRIN) 600 MG tablet Take 1 tablet by mouth 3 times daily as needed.      levothyroxine (SYNTHROID/LEVOTHROID) 112 MCG tablet Take 1 tablet (112 mcg) by mouth daily 90 tablet 3    LORazepam (ATIVAN) 0.5 MG tablet Take 30 minutes prior to departure.  Do not operate a vehicle after taking this medication 10 tablet 0    norethindrone-ethinyl estradiol (FEMHRT 1/5) 1-5 MG-MCG tablet Take 1 tablet by mouth daily 90 tablet 3    Omega-3 Fatty Acids (FISH OIL PO)       rosuvastatin (CRESTOR) 20 MG tablet Take 1 tablet (20 mg) by mouth daily 90 tablet 3    sertraline (ZOLOFT) 25 MG tablet Take 1 tablet (25 mg) by mouth daily 30 tablet 0    " valACYclovir (VALTREX) 1000 mg tablet Take 2 at onset of cold sore then repeat 2 tablets in 12hr 8 tablet 1    Vitamin D3 (CHOLECALCIFEROL) 25 mcg (1000 units) tablet Take 1 tablet (25 mcg) by mouth daily 90 tablet 3     No current facility-administered medications for this visit.       Social History     Tobacco Use    Smoking status: Former     Current packs/day: 0.00     Average packs/day: 0.5 packs/day for 8.0 years (4.0 ttl pk-yrs)     Types: Cigarettes     Start date: 1991     Quit date: 1999     Years since quittin.1    Smokeless tobacco: Never   Vaping Use    Vaping status: Never Used   Substance Use Topics    Alcohol use: Yes     Alcohol/week: 1.0 standard drink of alcohol     Types: 1 Glasses of wine per week     Comment: 1 glass of wine with dinner, once a week.    Drug use: No     Comment: no herbal meds either       Health Maintenance Due   Topic Date Due    ADVANCE CARE PLANNING  Never done    HEPATITIS B IMMUNIZATION (2 of 3 - 19+ 3-dose series) 2004    ZOSTER IMMUNIZATION (1 of 2) Never done    GLUCOSE  2023    LIPID  2024    TSH W/FREE T4 REFLEX  2024    MAMMO SCREENING  2024       Lab Results   Component Value Date    PAP NIL 2017 8:45 AM

## 2024-09-17 ENCOUNTER — ANCILLARY PROCEDURE (OUTPATIENT)
Dept: MAMMOGRAPHY | Facility: CLINIC | Age: 54
End: 2024-09-17
Attending: INTERNAL MEDICINE
Payer: COMMERCIAL

## 2024-09-17 DIAGNOSIS — Z12.31 VISIT FOR SCREENING MAMMOGRAM: ICD-10-CM

## 2024-09-17 PROCEDURE — 77063 BREAST TOMOSYNTHESIS BI: CPT | Mod: GC | Performed by: STUDENT IN AN ORGANIZED HEALTH CARE EDUCATION/TRAINING PROGRAM

## 2024-09-17 PROCEDURE — 77067 SCR MAMMO BI INCL CAD: CPT | Mod: GC | Performed by: STUDENT IN AN ORGANIZED HEALTH CARE EDUCATION/TRAINING PROGRAM

## 2025-02-27 NOTE — NURSING NOTE
Chief Complaint   Patient presents with     Annual Visit        [Yes] : Yes [4 or more  times a week (4 pts)] : 4 or more  times a week (4 points) [3 or 4 (1 pt)] : 3 or 4  (1 point) [Less than monthly (1 pt)] : Less than monthly (1 point) [de-identified] : At least 2-3 beers daily

## 2025-07-12 DIAGNOSIS — F32.89 MINOR DEPRESSIVE DISORDER: ICD-10-CM

## 2025-07-14 RX ORDER — SERTRALINE HYDROCHLORIDE 25 MG/1
25 TABLET, FILM COATED ORAL DAILY
Qty: 90 TABLET | Refills: 0 | Status: SHIPPED | OUTPATIENT
Start: 2025-07-14

## 2025-07-14 NOTE — TELEPHONE ENCOUNTER
Medication requested: sertraline (ZOLOFT) 25 MG tablet   Last office visit: 7/16/24  [facility] appointments: 9/22/25  Medication last refilled: 7/16/24; 90 + 3 refills  Last qualifying labs:    7/16/2024  8:34 AM   PHQ-9 / BORIS-7 Scores 8/2015 to present    BORIS-7 Score DocFlow 6       7/16/2024  8:33 AM   PHQ-9 / BORIS-7 Scores 8/2015 to present    PHQ-9 Score DocFlow 6      Prescription approved per [facility] Refill Protocol.    Pasquale BURNS, RN  07/14/25 2:43 PM

## 2025-08-09 DIAGNOSIS — N95.1 SYMPTOMATIC MENOPAUSAL OR FEMALE CLIMACTERIC STATES: ICD-10-CM

## 2025-08-12 RX ORDER — NORETHINDRONE ACETATE AND ETHINYL ESTRADIOL .005; 1 MG/1; MG/1
1 TABLET, FILM COATED ORAL DAILY
Qty: 90 TABLET | Refills: 0 | Status: SHIPPED | OUTPATIENT
Start: 2025-08-12

## 2025-08-16 ENCOUNTER — HEALTH MAINTENANCE LETTER (OUTPATIENT)
Age: 55
End: 2025-08-16

## 2025-08-18 ENCOUNTER — MYC MEDICAL ADVICE (OUTPATIENT)
Dept: FAMILY MEDICINE | Facility: CLINIC | Age: 55
End: 2025-08-18

## 2025-08-18 DIAGNOSIS — F41.8 SITUATIONAL ANXIETY: ICD-10-CM

## 2025-08-20 RX ORDER — LORAZEPAM 0.5 MG/1
TABLET ORAL
Qty: 10 TABLET | Refills: 0 | Status: SHIPPED | OUTPATIENT
Start: 2025-08-20